# Patient Record
Sex: MALE | Race: WHITE | Employment: OTHER | URBAN - METROPOLITAN AREA
[De-identification: names, ages, dates, MRNs, and addresses within clinical notes are randomized per-mention and may not be internally consistent; named-entity substitution may affect disease eponyms.]

---

## 2017-01-09 ENCOUNTER — CLINICAL SUPPORT (OUTPATIENT)
Dept: CARDIOLOGY CLINIC | Age: 77
End: 2017-01-09

## 2017-01-09 DIAGNOSIS — I35.1 AORTIC VALVE INSUFFICIENCY, UNSPECIFIED ETIOLOGY: ICD-10-CM

## 2017-01-09 DIAGNOSIS — I25.10 CORONARY ARTERY DISEASE INVOLVING NATIVE CORONARY ARTERY OF NATIVE HEART WITHOUT ANGINA PECTORIS: Primary | ICD-10-CM

## 2017-01-11 ENCOUNTER — TELEPHONE (OUTPATIENT)
Dept: CARDIOLOGY CLINIC | Age: 77
End: 2017-01-11

## 2017-02-09 RX ORDER — CARVEDILOL 12.5 MG/1
TABLET ORAL
Qty: 180 TAB | Refills: 0 | Status: SHIPPED | OUTPATIENT
Start: 2017-02-09 | End: 2017-04-28 | Stop reason: SDUPTHER

## 2017-04-29 RX ORDER — CARVEDILOL 12.5 MG/1
TABLET ORAL
Qty: 180 TAB | Refills: 0 | Status: SHIPPED | OUTPATIENT
Start: 2017-04-29 | End: 2017-06-22 | Stop reason: SDUPTHER

## 2017-06-22 ENCOUNTER — OFFICE VISIT (OUTPATIENT)
Dept: CARDIOLOGY CLINIC | Age: 77
End: 2017-06-22

## 2017-06-22 VITALS
HEART RATE: 66 BPM | OXYGEN SATURATION: 97 % | BODY MASS INDEX: 32.02 KG/M2 | HEIGHT: 65 IN | SYSTOLIC BLOOD PRESSURE: 156 MMHG | DIASTOLIC BLOOD PRESSURE: 84 MMHG | WEIGHT: 192.2 LBS | RESPIRATION RATE: 18 BRPM

## 2017-06-22 DIAGNOSIS — E78.5 DYSLIPIDEMIA: ICD-10-CM

## 2017-06-22 DIAGNOSIS — G47.33 OSA ON CPAP: ICD-10-CM

## 2017-06-22 DIAGNOSIS — Z99.89 OSA ON CPAP: ICD-10-CM

## 2017-06-22 DIAGNOSIS — I10 ESSENTIAL HYPERTENSION WITH GOAL BLOOD PRESSURE LESS THAN 130/80: ICD-10-CM

## 2017-06-22 DIAGNOSIS — I25.10 CORONARY ARTERY DISEASE INVOLVING NATIVE CORONARY ARTERY OF NATIVE HEART WITHOUT ANGINA PECTORIS: Primary | ICD-10-CM

## 2017-06-22 RX ORDER — CARVEDILOL 12.5 MG/1
TABLET ORAL
Qty: 180 TAB | Refills: 3 | Status: SHIPPED | OUTPATIENT
Start: 2017-06-22 | End: 2018-09-08 | Stop reason: SDUPTHER

## 2017-06-22 RX ORDER — COLCHICINE 0.6 MG/1
0.6 TABLET ORAL AS NEEDED
COMMUNITY

## 2017-06-22 RX ORDER — GLIPIZIDE 2.5 MG/1
5 TABLET, EXTENDED RELEASE ORAL 2 TIMES DAILY WITH MEALS
COMMUNITY

## 2017-06-22 NOTE — MR AVS SNAPSHOT
Visit Information Date & Time Provider Department Dept. Phone Encounter #  
 6/22/2017 10:40 AM Artemio Brewster MD [facility] Jacey Orozco [phone] [id number] Your Appointments 12/21/2017 10:20 AM  
ESTABLISHED PATIENT with Artemio Brewster MD  
[facility] ([facility]) Appt Note: 6 MO FUP  
 [id number] [address] [facility]  
[address] [place] [address] [facility] [address] Upcoming Health Maintenance Date Due DTaP/Tdap/Td series (1 - Tdap) 4/3/1962 ZOSTER VACCINE AGE 60> 4/3/2001 GLAUCOMA SCREENING Q2Y 4/3/2006 Pneumococcal 65+ Low/Medium Risk (1 of 2 - PCV13) 4/3/2006 MEDICARE YEARLY EXAM 4/3/2006 INFLUENZA AGE 9 TO ADULT 8/1/2017 Allergies as of 6/22/2017  Review Complete On: 6/22/2017 By: Connor Mcgowan LPN Severity Noted Reaction Type Reactions Sulfa (Sulfonamide Antibiotics)  11/18/2015    Unknown (comments) Current Immunizations  Never Reviewed No immunizations on file. Not reviewed this visit Vitals BP Pulse Resp Height(growth percentile) Weight(growth percentile) SpO2  
 156/84 (BP 1 Location: Right arm, BP Patient Position: Sitting) 66 18 5' 5\" (1.651 m) 192 lb 3.2 oz (87.2 kg) 97% BMI Smoking Status 31.98 kg/m2 Former Smoker BMI and BSA Data Body Mass Index Body Surface Area 31.98 kg/m 2 2 m 2 Preferred Pharmacy Pharmacy Name Phone [facility] [address] [phone] Your Updated Medication List  
  
   
This list is accurate as of: 6/22/17 10:54 AM.  Always use your most recent med list.  
  
  
  
  
 aspirin 81 mg chewable tablet Take 1 Tab by mouth daily. carvedilol 12.5 mg tablet Commonly known as:  COREG  
TAKE 1 TABLET BY MOUTH TWICE DAILY WITH MEALS  
  
 colchicine 0.6 mg tablet Take 0.6 mg by mouth as needed. cpap machine kit  
by Does Not Apply route. fluticasone 50 mcg/actuation nasal spray Commonly known as:  Elma Fray 2 Sprays by Both Nostrils route as needed for Rhinitis. glipiZIDE SR 2.5 mg CR tablet Commonly known as:  GLUCOTROL XL Take  by mouth daily. LIPITOR 10 mg tablet Generic drug:  atorvastatin Take  by mouth daily. TYLENOL PM PO Take  by mouth as needed. Patient Instructions See Dr. Cristina Barajas in 6 months. Introducing [facility] & HEALTH SERVICES! Toribio Nyhan introduces [employer] patient portal. Now you can access parts of your medical record, email your doctor's office, and request medication refills online. 1. In your internet browser, go to [email or web address] 2. Click on the First Time User? Click Here link in the Sign In box. You will see the New Member Sign Up page. 3. Enter your [employer] Access Code exactly as it appears below. You will not need to use this code after youve completed the sign-up process. If you do not sign up before the expiration date, you must request a new code. · [employer] Access Code: [email or web address] Expires: 9/20/2017 10:50 AM 
 
4. Enter the last four digits of your Social Security Number (xxxx) and Date of Birth (mm/dd/yyyy) as indicated and click Submit. You will be taken to the next sign-up page. 5. Create a [employer] ID. This will be your [employer] login ID and cannot be changed, so think of one that is secure and easy to remember. 6. Create a [employer] password. You can change your password at any time. 7. Enter your Password Reset Question and Answer. This can be used at a later time if you forget your password. 8. Enter your e-mail address. You will receive e-mail notification when new information is available in [address] 9. Click Sign Up. You can now view and download portions of your medical record. 10. Click the Download Summary menu link to download a portable copy of your medical information. If you have questions, please visit the Frequently Asked Questions section of the [employer] website. Remember, [employer] is NOT to be used for urgent needs. For medical emergencies, dial 911. Now available from your iPhone and Android! Please provide this summary of care documentation to your next provider. Your primary care clinician is listed as Marbin Earl. If you have any questions after today's visit, please call [phone].

## 2017-06-22 NOTE — PROGRESS NOTES
Office Follow-up    NAME: Ana Figueroa   :  1940  MRM:  [id number]    Date:  2017            Assessment:     Problem List  Date Reviewed: 2017          Codes Class Noted    CAD (coronary artery disease) ICD-10-CM: I25.10  ICD-9-CM: 414.00  2015        Chest pain ICD-10-CM: R07.9  ICD-9-CM: 786.50  2015        SOB (shortness of breath) ICD-10-CM: R06.02  ICD-9-CM: 786.05  2015                 Plan:     1. CAD/ S/p LAD TRELL x2. POBA LCX: Now he is off DAPT. Continue ASA and Coreg. Continue Statins. 2. HTN: Blood pressure in office today is high but he conveys that he goes to cardiac rehab on a weekly basis and his blood pressure over at cardiac rehab is normal.  At this time we will continue to watch blood pressure. Today's elevation is likely white coat hypertension. Continue Coreg. 3. Hyperlipidemia: NMR lipids from 16 were all within normal range. Continue statins. 4. ELLYN: Now formally diagnosed and uses CPAP. 5. GERD: PPI as needed. 6. Murmur: Recent echo shows AV valve sclerosis. 7. FU with me in 6 months. Subjective:     Ana Figueroa, a 69y.o. year-old who presents for follow up. He is doing well without any significant symptoms. He is now off Brilinta. He exercise on a regular basis and goes to cardiac rehab at [facility] twice per week.   Exam:     Physical Exam:  Visit Vitals    /84 (BP 1 Location: Right arm, BP Patient Position: Sitting)    Pulse 66    Resp 18    Ht 5' 5\" (1.651 m)    Wt 192 lb 3.2 oz (87.2 kg)    SpO2 97%    BMI 31.98 kg/m2     General appearance - alert, well appearing, and in no distress  Mental status - affect appropriate to mood  Eyes - sclera anicteric, moist mucous membranes  Neck - supple, no significant adenopathy  Chest - clear to auscultation, no wheezes, rales or rhonchi  Heart - normal rate, regular rhythm, normal S1, S2, ESM 3/6 aortic region, rubs, clicks or gallops  Extremities - peripheral pulses normal, no pedal edema  Skin - normal coloration  no rashes    Medications:     Current Outpatient Prescriptions   Medication Sig    colchicine 0.6 mg tablet Take 0.6 mg by mouth as needed.  glipiZIDE SR (GLUCOTROL XL) 2.5 mg CR tablet Take  by mouth daily.  carvedilol (COREG) 12.5 mg tablet TAKE 1 TABLET BY MOUTH TWICE DAILY WITH MEALS    cpap machine kit by Does Not Apply route.  ACETAMINOPHEN/DIPHENHYDRAMINE (TYLENOL PM PO) Take  by mouth as needed.  aspirin 81 mg chewable tablet Take 1 Tab by mouth daily.  atorvastatin (LIPITOR) 10 mg tablet Take  by mouth daily.  fluticasone (FLONASE) 50 mcg/actuation nasal spray 2 Sprays by Both Nostrils route as needed for Rhinitis. No current facility-administered medications for this visit. Diagnostic Data Review:     EKG: normal sinus rhythm, normal ST segment, normal axis, normal intervals. 1/9/17: ECHO- LVEF 55%, mild hypokinesis of the basal-mid inferolateral wall(s), G1DD; Mild MR; AV leaflets exhibited sclerosis, mod AR; mild TR; mild DC    12/28/16: LEXISCAN- Normal Study LVEF 87%, no ischemic ST changes  CMRI 12/2/16 12/9/2015: CATH  --- S/p TRELL ( 2.25 x 23, 2.25 x 12 )-> mLAD, PTCA mLCX W/ residual 80-90% but improved flow in distal vess  11/18/15: STRESS NUC- EF 55%, inferolateral mod partially reversible defect w/ mid-mod carole-infarct ischemia   El  11/18/15: CARDIAC MRI- 1. Normal left ventricular cavity size with preserved left ventricular   systolic function. Moderate hypokinesis of the base to mid and distal   lateral wall. Thinning of the basal lateral wall. 3-D LVEF 62%. 2. Normal right ventricular size and systolic function. RVEF 55%. 3. Mild 1+ aortic regurgitation. 4. 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 There is only a thin   rim of viable myocardium subtending this infarct.   The entire anterior wall, anteroseptal wall, anteroapical wall, apex, inferior wall, inferoseptal wall   and inferolateral wall does not demonstrate any infarct and are completely   viable. Based on the viability study the LCx territory does not demonstrate   any significant viability and has a large infarct and will not recover upon   revascularization. The LAD territory and RCA territories demonstrate   significant viability and does not demonstrate any infarct. 5. Normal pleura and pericardium. There is no significant effusions. 6. Mildly dilated ascending aorta measuring 40 x 30 mm. 7. Possibly enlarged thyroid with possible thyroid nodule. Lab Review:     Lab Results   Component Value Date/Time    Cholesterol, Total 146 01/06/2016 07:49 AM     Lab Results   Component Value Date/Time    Creatinine 0.94 12/10/2015 03:00 AM     Lab Results   Component Value Date/Time    BUN 11 12/10/2015 03:00 AM     Lab Results   Component Value Date/Time    Potassium 4.5 12/10/2015 03:00 AM     No results found for: HBA1C, HGBE8, FBH1LFRE, XGB3RXNZ  Lab Results   Component Value Date/Time    HGB 14.6 11/24/2015 07:45 AM     Lab Results   Component Value Date/Time    PLATELET 411 22/23/2566 07:45 AM     No results for input(s): CPK, CKMB, TROIQ in the last 72 hours. No lab exists for component: CKQMB, CPKMB         ___________________________________________________    Hermelinda Courts.  Rashard Root MD, [facility] [place]

## 2017-12-21 ENCOUNTER — OFFICE VISIT (OUTPATIENT)
Dept: CARDIOLOGY CLINIC | Age: 77
End: 2017-12-21

## 2017-12-21 VITALS
HEART RATE: 65 BPM | WEIGHT: 197.8 LBS | DIASTOLIC BLOOD PRESSURE: 80 MMHG | OXYGEN SATURATION: 97 % | RESPIRATION RATE: 16 BRPM | HEIGHT: 65 IN | SYSTOLIC BLOOD PRESSURE: 148 MMHG | BODY MASS INDEX: 32.96 KG/M2

## 2017-12-21 DIAGNOSIS — I25.10 CORONARY ARTERY DISEASE INVOLVING NATIVE CORONARY ARTERY OF NATIVE HEART WITHOUT ANGINA PECTORIS: Primary | ICD-10-CM

## 2017-12-21 DIAGNOSIS — I10 ESSENTIAL HYPERTENSION: ICD-10-CM

## 2017-12-21 DIAGNOSIS — I35.0 NONRHEUMATIC AORTIC VALVE STENOSIS: ICD-10-CM

## 2017-12-21 NOTE — PROGRESS NOTES
Office Follow-up    NAME: Jesica Mendes   :  1940  MRM:  [id number]    Date:  2017            Assessment:     Problem List  Date Reviewed: 2017          Codes Class Noted    CAD (coronary artery disease) ICD-10-CM: I25.10  ICD-9-CM: 414.00  2015        Chest pain ICD-10-CM: R07.9  ICD-9-CM: 786.50  2015        SOB (shortness of breath) ICD-10-CM: R06.02  ICD-9-CM: 786.05  2015                 Plan:     1. CAD/ S/p LAD TRELL x2. POBA LCX: Now he is off DAPT. Continue ASA and Coreg. Continue Statins. 2. HTN: Blood pressure in office today is high. At this time we will continue to watch blood pressure. Today's elevation is likely white coat hypertension. Continue Coreg. 3. Hyperlipidemia: NMR lipids from 16 were all within normal range. Continue statins. 4. ELLYN: Now formally diagnosed and uses CPAP. 5. GERD: PPI as needed. 6. Murmur: Recent echo shows AV valve sclerosis. 7. FU with me in 6 months. Subjective:     Jesica Mendes, a 69y.o. year-old who presents for follow up. He is doing well without any significant symptoms. He is now off Brilinta. He has been hunting recently and has experienced occasional very mild upper anterior chest wall pain on exertion. This is not always present. He is otherwise doing well.       Exam:     Physical Exam:  Visit Vitals    /80 (BP 1 Location: Left arm, BP Patient Position: Sitting)    Pulse 65    Resp 16    Ht 5' 5\" (1.651 m)    Wt 197 lb 12.8 oz (89.7 kg)    SpO2 97%    BMI 32.92 kg/m2     General appearance - alert, well appearing, and in no distress  Mental status - affect appropriate to mood  Eyes - sclera anicteric, moist mucous membranes  Neck - supple, no significant adenopathy  Chest - clear to auscultation, no wheezes, rales or rhonchi  Heart - normal rate, regular rhythm, normal S1, S2, ESM 3/6 aortic region, rubs, clicks or gallops  Extremities - peripheral pulses normal, no pedal edema  Skin - normal coloration  no rashes    Medications:     Current Outpatient Prescriptions   Medication Sig    colchicine 0.6 mg tablet Take 0.6 mg by mouth as needed.  glipiZIDE SR (GLUCOTROL XL) 2.5 mg CR tablet Take  by mouth daily.  carvedilol (COREG) 12.5 mg tablet TAKE 1 TABLET BY MOUTH TWICE DAILY WITH MEALS    cpap machine kit by Does Not Apply route.  fluticasone (FLONASE) 50 mcg/actuation nasal spray 2 Sprays by Both Nostrils route as needed for Rhinitis.  aspirin 81 mg chewable tablet Take 1 Tab by mouth daily.  atorvastatin (LIPITOR) 10 mg tablet Take  by mouth daily.  ACETAMINOPHEN/DIPHENHYDRAMINE (TYLENOL PM PO) Take  by mouth as needed. No current facility-administered medications for this visit. Diagnostic Data Review:     EKG: normal sinus rhythm, normal ST segment, normal axis, normal intervals. 1/9/17: ECHO- LVEF 55%, mild hypokinesis of the basal-mid inferolateral wall(s), G1DD; Mild MR; AV leaflets exhibited sclerosis, mod AR; mild TR; mild WA    12/28/16: LEXISCAN- Normal Study LVEF 87%, no ischemic ST changes  CMRI 12/2/15    12/9/2015: CATH  --- S/p TRELL ( 2.25 x 23, 2.25 x 12 )-> mLAD, PTCA mLCX W/ residual 80-90% but improved flow in distal vess  11/18/15: STRESS NUC- EF 55%, inferolateral mod partially reversible defect w/ mid-mod carole-infarct ischemia   El  12/2/15: CARDIAC MRI- 1. Normal left ventricular cavity size with preserved left ventricular   systolic function. Moderate hypokinesis of the base to mid and distal   lateral wall. Thinning of the basal lateral wall. 3-D LVEF 62%. 2. Normal right ventricular size and systolic function. RVEF 55%. 3. Mild 1+ aortic regurgitation. 4. 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 There is only a thin   rim of viable myocardium subtending this infarct.  The entire anterior wall,   anteroseptal wall, anteroapical wall, apex, inferior wall, inferoseptal wall   and inferolateral wall does not demonstrate any infarct and are completely   viable. Based on the viability study the LCx territory does not demonstrate   any significant viability and has a large infarct and will not recover upon   revascularization. The LAD territory and RCA territories demonstrate   significant viability and does not demonstrate any infarct. 5. Normal pleura and pericardium. There is no significant effusions. 6. Mildly dilated ascending aorta measuring 40 x 30 mm. 7. Possibly enlarged thyroid with possible thyroid nodule. Lab Review:     Lab Results   Component Value Date/Time    Cholesterol, Total 146 01/06/2016 07:49 AM     Lab Results   Component Value Date/Time    Creatinine 0.94 12/10/2015 03:00 AM     Lab Results   Component Value Date/Time    BUN 11 12/10/2015 03:00 AM     Lab Results   Component Value Date/Time    Potassium 4.5 12/10/2015 03:00 AM     No results found for: HBA1C, HGBE8, NSZ9DNFV, OMV0JLYU  Lab Results   Component Value Date/Time    HGB 14.6 11/24/2015 07:45 AM     Lab Results   Component Value Date/Time    PLATELET 996 06/00/1674 07:45 AM     No results for input(s): CPK, CKMB, TROIQ in the last 72 hours. No lab exists for component: CKQMB, CPKMB         ___________________________________________________    Chay Hogan.  Farzana Pink MD, [facility] [place]

## 2017-12-21 NOTE — PROGRESS NOTES
Elan Syed is a 69 y.o. male  Chief Complaint   Patient presents with    Coronary Artery Disease     6 mo f/u     1. Have you been to an emergency room, urgent clinic, or hospitalized since your last visit? NO  If yes, where when, and reason for visit? 2. Have seen or consulted any other health care provider since your last visit? NO  Please include any pap smears or colon screening in this section  If yes, where when, and reason for visit?

## 2017-12-21 NOTE — MR AVS SNAPSHOT
Visit Information Date & Time Provider Department Dept. Phone Encounter #  
 12/21/2017 10:20 AM Kyara Weber MD [facility] Teddy Johansen [phone] [id number] Upcoming Health Maintenance Date Due DTaP/Tdap/Td series (1 - Tdap) 4/3/1962 ZOSTER VACCINE AGE 60> 2/3/2001 GLAUCOMA SCREENING Q2Y 4/3/2006 Pneumococcal 65+ Low/Medium Risk (1 of 2 - PCV13) 4/3/2006 MEDICARE YEARLY EXAM 4/3/2006 Influenza Age 5 to Adult 8/1/2017 Allergies as of 12/21/2017  Review Complete On: 12/21/2017 By: Kyara Weber MD  
  
 Severity Noted Reaction Type Reactions Sulfa (Sulfonamide Antibiotics)  11/18/2015    Unknown (comments) Current Immunizations  Never Reviewed No immunizations on file. Not reviewed this visit You Were Diagnosed With   
  
 Codes Comments Coronary artery disease involving native coronary artery of native heart without angina pectoris    -  Primary ICD-10-CM: I25.10 ICD-9-CM: 414.01 Vitals BP Pulse Resp Height(growth percentile) Weight(growth percentile) SpO2  
 148/80 (BP 1 Location: Left arm, BP Patient Position: Sitting) 65 16 5' 5\" (1.651 m) 197 lb 12.8 oz (89.7 kg) 97% BMI Smoking Status 32.92 kg/m2 Former Smoker Vitals History BMI and BSA Data Body Mass Index Body Surface Area  
 32.92 kg/m 2 2.03 m 2 Preferred Pharmacy Pharmacy Name Phone [facility] [address] AT [address]  [phone] Your Updated Medication List  
  
   
This list is accurate as of: 12/21/17 10:46 AM.  Always use your most recent med list.  
  
  
  
  
 aspirin 81 mg chewable tablet Take 1 Tab by mouth daily. carvedilol 12.5 mg tablet Commonly known as:  COREG  
TAKE 1 TABLET BY MOUTH TWICE DAILY WITH MEALS  
  
 colchicine 0.6 mg tablet Take 0.6 mg by mouth as needed. cpap machine kit  
by Does Not Apply route. fluticasone 50 mcg/actuation nasal spray Commonly known as:  Kelsey Gut 2 Sprays by Both Nostrils route as needed for Rhinitis. glipiZIDE SR 2.5 mg CR tablet Commonly known as:  GLUCOTROL XL Take  by mouth daily. LIPITOR 10 mg tablet Generic drug:  atorvastatin Take  by mouth daily. TYLENOL PM PO Take  by mouth as needed. We Performed the Following AMB POC EKG ROUTINE W/ 12 LEADS, INTER & REP [59913 CPT(R)] Patient Instructions See Dr. Jameel Oviedo in 6 months. Introducing [facility]! [employer] introduces [employer] patient portal. Now you can access parts of your medical record, email your doctor's office, and request medication refills online. 1. In your internet browser, go to [email or web address] 2. Click on the First Time User? Click Here link in the Sign In box. You will see the New Member Sign Up page. 3. Enter your [employer] Access Code exactly as it appears below. You will not need to use this code after youve completed the sign-up process. If you do not sign up before the expiration date, you must request a new code. · [employer] Access Code: [email or web address] Expires: 3/21/2018 10:46 AM 
 
4. Enter the last four digits of your Social Security Number (xxxx) and Date of Birth (mm/dd/yyyy) as indicated and click Submit. You will be taken to the next sign-up page. 5. Create a [employer] ID. This will be your [employer] login ID and cannot be changed, so think of one that is secure and easy to remember. 6. Create a [employer] password. You can change your password at any time. 7. Enter your Password Reset Question and Answer. This can be used at a later time if you forget your password. 8. Enter your e-mail address. You will receive e-mail notification when new information is available in [address] 9. Click Sign Up. You can now view and download portions of your medical record. 10. Click the Download Summary menu link to download a portable copy of your medical information. If you have questions, please visit the Frequently Asked Questions section of the [employer] website. Remember, [employer] is NOT to be used for urgent needs. For medical emergencies, dial 911. Now available from your iPhone and Android! Please provide this summary of care documentation to your next provider. Your primary care clinician is listed as Shilpa De La Rosa. If you have any questions after today's visit, please call [phone].

## 2018-06-08 ENCOUNTER — OFFICE VISIT (OUTPATIENT)
Dept: CARDIOLOGY CLINIC | Age: 78
End: 2018-06-08

## 2018-06-08 VITALS
DIASTOLIC BLOOD PRESSURE: 82 MMHG | HEIGHT: 65 IN | HEART RATE: 64 BPM | WEIGHT: 200 LBS | OXYGEN SATURATION: 97 % | BODY MASS INDEX: 33.32 KG/M2 | SYSTOLIC BLOOD PRESSURE: 140 MMHG

## 2018-06-08 DIAGNOSIS — G47.33 OSA ON CPAP: ICD-10-CM

## 2018-06-08 DIAGNOSIS — Z99.89 OSA ON CPAP: ICD-10-CM

## 2018-06-08 DIAGNOSIS — I10 ESSENTIAL HYPERTENSION WITH GOAL BLOOD PRESSURE LESS THAN 130/80: ICD-10-CM

## 2018-06-08 DIAGNOSIS — I10 ESSENTIAL HYPERTENSION: ICD-10-CM

## 2018-06-08 DIAGNOSIS — I25.10 CORONARY ARTERY DISEASE INVOLVING NATIVE CORONARY ARTERY OF NATIVE HEART WITHOUT ANGINA PECTORIS: Primary | ICD-10-CM

## 2018-06-08 DIAGNOSIS — I71.21 ASCENDING AORTIC ANEURYSM: ICD-10-CM

## 2018-06-08 DIAGNOSIS — I35.0 NONRHEUMATIC AORTIC VALVE STENOSIS: ICD-10-CM

## 2018-06-08 DIAGNOSIS — E78.5 DYSLIPIDEMIA: ICD-10-CM

## 2018-06-08 NOTE — PROGRESS NOTES
Visit Vitals    /82 (BP 1 Location: Left arm, BP Patient Position: Sitting)    Pulse 64    Ht 5' 5\" (1.651 m)    Wt 200 lb (90.7 kg)    SpO2 97%    BMI 33.28 kg/m2

## 2018-06-08 NOTE — MR AVS SNAPSHOT
[address] [facility] 600 70 [facility] 
[phone] Patient: Stacey Nguyen MRN: [id number] Visit Information Date & Time Provider Department Dept. Phone Encounter #  
 2018  3:20 PM Nelson Guy MD [facility] Erwin Gallegos [phone] [id number] Follow-up Instructions Follow-up and Disposition History Your Appointments 2019  1:00 PM  
ECHO CARDIOGRAMS 2D with ECHOGEREMIAS  
[facility] (FANNIE SCHEDULING) Appt Note: See Dr. Dinesh Mcnally in 1 year with same day Echo for aortic regurgitation. 320 [facility] 600 [place] [address]  
[phone]  
  
   
 [address] [address]  
  
    
 2019  1:40 PM  
ESTABLISHED PATIENT with Nelson Guy MD  
[address] ([address]) Appt Note: See Dr. Dinesh Mcnally in 1 year with same day Echo for aortic regurgitation. 320 [facility] [address] [place] [address]  
54 e [facility] [address] [address] Upcoming Health Maintenance Date Due DTaP/Tdap/Td series (1 - Tdap) 4/3/1962 ZOSTER VACCINE AGE 60> 2/3/2001 GLAUCOMA SCREENING Q2Y 4/3/2006 Pneumococcal 65+ Low/Medium Risk (1 of 2 - PCV13) 4/3/2006 MEDICARE YEARLY EXAM 3/14/2018 Influenza Age 5 to Adult 2018 Allergies as of 2018  Review Complete On: 2018 By: Nelson Guy MD  
  
 Severity Noted Reaction Type Reactions Sulfa (Sulfonamide Antibiotics)  2015    Unknown (comments) Current Immunizations  Never Reviewed No immunizations on file. Not reviewed this visit You Were Diagnosed With   
  
 Codes Comments Coronary artery disease involving native coronary artery of native heart without angina pectoris    -  Primary ICD-10-CM: I25.10 ICD-9-CM: 414.01 Essential hypertension     ICD-10-CM: I10 
ICD-9-CM: 401.9 Nonrheumatic aortic valve stenosis     ICD-10-CM: I35.0 ICD-9-CM: 424.1 Essential hypertension with goal blood pressure less than 130/80     ICD-10-CM: I10 
ICD-9-CM: 401.9 ELLYN on CPAP     ICD-10-CM: G47.33, Z99.89 ICD-9-CM: 327.23, V46.8 Dyslipidemia     ICD-10-CM: E78.5 ICD-9-CM: 272.4 Ascending aortic aneurysm (HCC)     ICD-10-CM: I71.2 ICD-9-CM: 347. 2 Vitals BP Pulse Height(growth percentile) Weight(growth percentile) SpO2 BMI  
 140/82 (BP 1 Location: Left arm, BP Patient Position: Sitting) 64 5' 5\" (1.651 m) 200 lb (90.7 kg) 97% 33.28 kg/m2 Smoking Status Former Smoker Vitals History BMI and BSA Data Body Mass Index Body Surface Area  
 33.28 kg/m 2 2.04 m 2 Preferred Pharmacy Pharmacy Name Phone [id number] [address] [place] [address]) [phone] Your Updated Medication List  
  
   
This list is accurate as of 6/8/18  3:44 PM.  Always use your most recent med list.  
  
  
  
  
 aspirin 81 mg chewable tablet Take 1 Tab by mouth daily. carvedilol 12.5 mg tablet Commonly known as:  COREG  
TAKE 1 TABLET BY MOUTH TWICE DAILY WITH MEALS  
  
 colchicine 0.6 mg tablet Take 0.6 mg by mouth as needed. cpap machine kit  
by Does Not Apply route. fluticasone 50 mcg/actuation nasal spray Commonly known as:  Cyndra Puna 2 Sprays by Both Nostrils route as needed for Rhinitis. glipiZIDE SR 2.5 mg CR tablet Commonly known as:  GLUCOTROL XL Take  by mouth daily. LIPITOR 10 mg tablet Generic drug:  atorvastatin Take  by mouth daily. TYLENOL PM PO Take  by mouth as needed. Patient Instructions See Dr. Adonay Kwan in 1 year with same day Echo for aortic regurgitation. Introducing [facility]! Gerald Garcia introduces [employer] patient portal. Now you can access parts of your medical record, email your doctor's office, and request medication refills online. 1. In your internet browser, go to [email or web address] 2. Click on the First Time User? Click Here link in the Sign In box. You will see the New Member Sign Up page. 3. Enter your [employer] Access Code exactly as it appears below. You will not need to use this code after youve completed the sign-up process. If you do not sign up before the expiration date, you must request a new code. · [employer] Access Code: [email or web address] Expires: 9/6/2018  3:44 PM 
 
4. Enter the last four digits of your Social Security Number (xxxx) and Date of Birth (mm/dd/yyyy) as indicated and click Submit. You will be taken to the next sign-up page. 5. Create a [employer] ID. This will be your [employer] login ID and cannot be changed, so think of one that is secure and easy to remember. 6. Create a [employer] password. You can change your password at any time. 7. Enter your Password Reset Question and Answer. This can be used at a later time if you forget your password. 8. Enter your e-mail address. You will receive e-mail notification when new information is available in [address] 9. Click Sign Up. You can now view and download portions of your medical record. 10. Click the Download Summary menu link to download a portable copy of your medical information. If you have questions, please visit the Frequently Asked Questions section of the [employer] website. Remember, [employer] is NOT to be used for urgent needs. For medical emergencies, dial 911. Now available from your iPhone and Android! Please provide this summary of care documentation to your next provider. Your primary care clinician is listed as Last Britton. If you have any questions after today's visit, please call [phone].

## 2018-06-08 NOTE — PROGRESS NOTES
Office Follow-up    NAME: Lizz Watson   :  1940  MRM:  [id number]    Date:  2018            Assessment:     Problem List  Date Reviewed: 2018          Codes Class Noted    CAD (coronary artery disease) ICD-10-CM: I25.10  ICD-9-CM: 414.00  2015        Chest pain ICD-10-CM: R07.9  ICD-9-CM: 786.50  2015        SOB (shortness of breath) ICD-10-CM: R06.02  ICD-9-CM: 786.05  2015                 Plan:     1. CAD/status post LAD TRELL stent ×2, probable LCx: Continue ASA and statins. 2. Moderate aortic regurgitation: Last echocardiogram back in 2017. Surveillance echocardiogram again in . 3. Hypertension: Blood pressure is controlled. Continue current medications. 4. Mild aortic aneurysm: Last MRI showed ascending aorta at 41 mm. Will do a surveillance MRI in  or .  5. Sleep apnea: Continue CPAP  6. Hyperlipidemia: Continue statins. 7. See Dr. Romeo Garcia in 1 year. Subjective:     Lizz Watson, a 69y.o. year-old who presents for followup. He has known history of CAD status post LAD TRELL stent ×2 and lipoma of the left circumflex artery. He is off of Plavix now. He is doing well. His blood pressure is controlled. He has no symptoms of chest pain, shortness of breath, lightheadedness, dizziness presyncope or syncope. Able to take his current medications. He has easy bruisability of the skin despite being only on aspirin 81 mg p.o. daily. Is also known to have moderate aortic regurgitation and mild aortic aneurysm.     Exam:     Physical Exam:  Visit Vitals    /82 (BP 1 Location: Left arm, BP Patient Position: Sitting)    Pulse 64    Ht 5' 5\" (1.651 m)    Wt 200 lb (90.7 kg)    SpO2 97%    BMI 33.28 kg/m2     General appearance - alert, well appearing, and in no distress  Mental status - affect appropriate to mood  Eyes - sclera anicteric, moist mucous membranes  Neck - supple, no significant adenopathy  Chest - clear to auscultation, no wheezes, rales or rhonchi  Heart - normal rate, regular rhythm, normal S1, S2, ESM grade 2/6 aortic region. No rubs, clicks or gallops  Abdomen - soft, nontender, nondistended, no masses or organomegaly  Extremities - peripheral pulses normal, no pedal edema  Skin - normal coloration  no rashes    Medications:     Current Outpatient Prescriptions   Medication Sig    colchicine 0.6 mg tablet Take 0.6 mg by mouth as needed.  glipiZIDE SR (GLUCOTROL XL) 2.5 mg CR tablet Take  by mouth daily.  carvedilol (COREG) 12.5 mg tablet TAKE 1 TABLET BY MOUTH TWICE DAILY WITH MEALS    cpap machine kit by Does Not Apply route.  fluticasone (FLONASE) 50 mcg/actuation nasal spray 2 Sprays by Both Nostrils route as needed for Rhinitis.  ACETAMINOPHEN/DIPHENHYDRAMINE (TYLENOL PM PO) Take  by mouth as needed.  aspirin 81 mg chewable tablet Take 1 Tab by mouth daily.  atorvastatin (LIPITOR) 10 mg tablet Take  by mouth daily. No current facility-administered medications for this visit. Diagnostic Data Review:       1/9/17: ECHO- LVEF 55%, mild hypokinesis of the basal-mid inferolateral wall(s), G1DD; Mild MR; AV leaflets exhibited sclerosis, mod AR; mild TR; mild VA    12/28/16: LEXISCAN- Normal Study LVEF 87%, no ischemic ST changes  CMRI 12/2/15    12/9/2015: CATH  --- S/p TRELL ( 2.25 x 23, 2.25 x 12 )-> mLAD, PTCA mLCX W/ residual 80-90% but improved flow in distal vess  11/18/15: STRESS NUC- EF 55%, inferolateral mod partially reversible defect w/ mid-mod carole-infarct ischemia   El  12/2/15: CARDIAC MRI- 1. Normal left ventricular cavity size with preserved left ventricular   systolic function. Moderate hypokinesis of the base to mid and distal   lateral wall. Thinning of the basal lateral wall. 3-D LVEF 62%. 2. Normal right ventricular size and systolic function. RVEF 55%. 3. Mild 1+ aortic regurgitation. 4.  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 There is only a thin   rim of viable myocardium subtending this infarct. The entire anterior wall,   anteroseptal wall, anteroapical wall, apex, inferior wall, inferoseptal wall   and inferolateral wall does not demonstrate any infarct and are completely   viable. Based on the viability study the LCx territory does not demonstrate   any significant viability and has a large infarct and will not recover upon   revascularization. The LAD territory and RCA territories demonstrate   significant viability and does not demonstrate any infarct. 5. Normal pleura and pericardium. There is no significant effusions. 6. Mildly dilated ascending aorta measuring 40 x 30 mm. 7. Possibly enlarged thyroid with possible thyroid nodule. Lab Review:     Lab Results   Component Value Date/Time    Cholesterol, Total 146 01/06/2016 07:49 AM     Lab Results   Component Value Date/Time    Creatinine 0.94 12/10/2015 03:00 AM     Lab Results   Component Value Date/Time    BUN 11 12/10/2015 03:00 AM     Lab Results   Component Value Date/Time    Potassium 4.5 12/10/2015 03:00 AM     No results found for: HBA1C, HGBE8, KRG4INLF  Lab Results   Component Value Date/Time    HGB 14.6 11/24/2015 07:45 AM     Lab Results   Component Value Date/Time    PLATELET 762 92/21/4945 07:45 AM     No results for input(s): CPK, CKMB, TROIQ in the last 72 hours. No lab exists for component: CKQMB, CPKMB             ___________________________________________________    Aviva Hinds.  Dinesh Mcnally MD, [facility]

## 2018-09-08 RX ORDER — CARVEDILOL 12.5 MG/1
TABLET ORAL
Qty: 180 TAB | Refills: 1 | Status: SHIPPED | OUTPATIENT
Start: 2018-09-08 | End: 2019-05-28 | Stop reason: SDUPTHER

## 2019-05-28 RX ORDER — CARVEDILOL 12.5 MG/1
12.5 TABLET ORAL 2 TIMES DAILY
Qty: 180 TAB | Refills: 3 | Status: SHIPPED | OUTPATIENT
Start: 2019-05-28 | End: 2020-06-01

## 2019-05-28 NOTE — TELEPHONE ENCOUNTER
Refill of carvedilol 12.5 mg BID completed per VO of Dr. Federico Schwarzt.     Last OV: 6/2018  Next OV: 6/2019

## 2019-06-24 ENCOUNTER — OFFICE VISIT (OUTPATIENT)
Dept: CARDIOLOGY CLINIC | Age: 79
End: 2019-06-24

## 2019-06-24 VITALS
HEIGHT: 65 IN | BODY MASS INDEX: 32.49 KG/M2 | WEIGHT: 195 LBS | RESPIRATION RATE: 16 BRPM | HEART RATE: 64 BPM | DIASTOLIC BLOOD PRESSURE: 94 MMHG | OXYGEN SATURATION: 98 % | SYSTOLIC BLOOD PRESSURE: 172 MMHG

## 2019-06-24 DIAGNOSIS — I10 ESSENTIAL HYPERTENSION: ICD-10-CM

## 2019-06-24 DIAGNOSIS — I25.10 CORONARY ARTERY DISEASE INVOLVING NATIVE CORONARY ARTERY OF NATIVE HEART WITHOUT ANGINA PECTORIS: ICD-10-CM

## 2019-06-24 DIAGNOSIS — I35.1 NONRHEUMATIC AORTIC VALVE INSUFFICIENCY: Primary | ICD-10-CM

## 2019-06-24 DIAGNOSIS — I10 ESSENTIAL HYPERTENSION WITH GOAL BLOOD PRESSURE LESS THAN 130/80: ICD-10-CM

## 2019-06-24 DIAGNOSIS — I34.0 NON-RHEUMATIC MITRAL REGURGITATION: ICD-10-CM

## 2019-06-24 DIAGNOSIS — I35.0 NONRHEUMATIC AORTIC VALVE STENOSIS: ICD-10-CM

## 2019-06-24 DIAGNOSIS — R06.02 SOB (SHORTNESS OF BREATH): ICD-10-CM

## 2019-06-24 DIAGNOSIS — I71.9 AORTIC ANEURYSM WITHOUT RUPTURE, UNSPECIFIED PORTION OF AORTA (HCC): ICD-10-CM

## 2019-06-24 RX ORDER — LISINOPRIL 10 MG/1
10 TABLET ORAL DAILY
Qty: 90 TAB | Refills: 0 | Status: SHIPPED | OUTPATIENT
Start: 2019-06-24 | End: 2019-09-09 | Stop reason: SDUPTHER

## 2019-06-24 NOTE — PROGRESS NOTES
Chief Complaint   Patient presents with    Annual Exam    Valvular Heart Disease     AR     Visit Vitals  BP (!) 172/94 (BP 1 Location: Right arm, BP Patient Position: Sitting)   Pulse 64   Resp 16   Ht 5' 5\" (1.651 m)   Wt 195 lb (88.5 kg)   SpO2 98%   BMI 32.45 kg/m²     Patient presents in office with c/o CP with exertion. /90 in left arm    No refills needed at this time. No recent hospital visits. Medication changes made per VO of Dr. Rafiq Marin. START Lisinopril 10mg daily. CMRI ordered per VO of Dr. Rafiq Marin.

## 2019-06-24 NOTE — PROGRESS NOTES
Office Follow-up    NAME: Lito Honeycutt   :  1940  MRM:  [id number]    Date:  2019            Assessment:     Problem List  Date Reviewed: 2019          Codes Class Noted    CAD (coronary artery disease) ICD-10-CM: I25.10  ICD-9-CM: 414.00  2015        Chest pain ICD-10-CM: R07.9  ICD-9-CM: 786.50  2015        SOB (shortness of breath) ICD-10-CM: R06.02  ICD-9-CM: 786.05  2015                 Plan:     1. CAD/status post LAD TRELL stent ×2, probable LCx: Continue ASA and statins. 2. Moderate aortic regurgitation: The aortic regurgitation is stable. 3. Hypertension: Blood pressure was elevated today. We will add lisinopril 10 mg p.o. daily current regimen. 4. Mild aortic aneurysm: On his last cardiac MRI his ascending aorta was 41 mm. We will do another surveillance MRI in   5. Sleep apnea: Continue CPAP  6. Hyperlipidemia: Continue statins. 7. See Dr. Mynor Ceron in 1 year. Subjective:     Lito Honeycutt, a 67y.o. year-old who presents for followup. He has known history of CAD status post LAD TRELL stent ×2 and lipoma of the left circumflex artery. He is off of Plavix now. Today he underwent echocardiogram.  There is no change in his aortic regurgitation. Currently denies any symptoms of chest pain, shortness of breath, lightheadedness or dizziness. His blood pressure was significantly elevated today with systolic blood pressure of 174. Normally his blood pressure at home per his own account stays within the normal range. EKG in my office today demonstrated sinus bradycardia with heart rate of 54 bpm with normal axis with normal intervals but with normal ST segment. Exam:     Physical Exam:  There were no vitals taken for this visit.   General appearance - alert, well appearing, and in no distress  Mental status - affect appropriate to mood  Eyes - sclera anicteric, moist mucous membranes  Neck - supple, no significant adenopathy  Chest - clear to auscultation, no wheezes, rales or rhonchi  Heart - normal rate, regular rhythm, normal S1, S2, ESM grade 2/6 aortic region. No rubs, clicks or gallops  Abdomen - soft, nontender, nondistended, no masses or organomegaly  Extremities - peripheral pulses normal, no pedal edema  Skin - normal coloration  no rashes    Medications:     Current Outpatient Medications   Medication Sig    carvedilol (COREG) 12.5 mg tablet Take 1 Tab by mouth two (2) times a day.  colchicine 0.6 mg tablet Take 0.6 mg by mouth as needed.  glipiZIDE SR (GLUCOTROL XL) 2.5 mg CR tablet Take  by mouth daily.  cpap machine kit by Does Not Apply route.  fluticasone (FLONASE) 50 mcg/actuation nasal spray 2 Sprays by Both Nostrils route as needed for Rhinitis.  ACETAMINOPHEN/DIPHENHYDRAMINE (TYLENOL PM PO) Take  by mouth as needed.  aspirin 81 mg chewable tablet Take 1 Tab by mouth daily.  atorvastatin (LIPITOR) 10 mg tablet Take  by mouth daily. No current facility-administered medications for this visit. Diagnostic Data Review:       1/9/17: ECHO- LVEF 55%, mild hypokinesis of the basal-mid inferolateral wall(s), G1DD; Mild MR; AV leaflets exhibited sclerosis, mod AR; mild TR; mild MT    12/28/16: LEXISCAN- Normal Study LVEF 87%, no ischemic ST changes  CMRI 12/2/15    12/9/2015: CATH  --- S/p TRELL ( 2.25 x 23, 2.25 x 12 )-> mLAD, PTCA mLCX W/ residual 80-90% but improved flow in distal vess  11/18/15: STRESS NUC- EF 55%, inferolateral mod partially reversible defect w/ mid-mod carole-infarct ischemia   El  12/2/15: CARDIAC MRI- 1. Normal left ventricular cavity size with preserved left ventricular   systolic function. Moderate hypokinesis of the base to mid and distal   lateral wall. Thinning of the basal lateral wall. 3-D LVEF 62%. 2. Normal right ventricular size and systolic function. RVEF 55%. 3. Mild 1+ aortic regurgitation. 4.  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 There is only a thin   rim of viable myocardium subtending this infarct. The entire anterior wall,   anteroseptal wall, anteroapical wall, apex, inferior wall, inferoseptal wall   and inferolateral wall does not demonstrate any infarct and are completely   viable. Based on the viability study the LCx territory does not demonstrate   any significant viability and has a large infarct and will not recover upon   revascularization. The LAD territory and RCA territories demonstrate   significant viability and does not demonstrate any infarct. 5. Normal pleura and pericardium. There is no significant effusions. 6. Mildly dilated ascending aorta measuring 40 x 30 mm. 7. Possibly enlarged thyroid with possible thyroid nodule. Lab Review:     Lab Results   Component Value Date/Time    Cholesterol, Total 146 01/06/2016 07:49 AM     Lab Results   Component Value Date/Time    Creatinine 0.94 12/10/2015 03:00 AM     Lab Results   Component Value Date/Time    BUN 11 12/10/2015 03:00 AM     Lab Results   Component Value Date/Time    Potassium 4.5 12/10/2015 03:00 AM     No results found for: HBA1C, HGBE8, HGD7XNEL, RNI7UFMX  Lab Results   Component Value Date/Time    HGB 14.6 11/24/2015 07:45 AM     Lab Results   Component Value Date/Time    PLATELET 027 37/39/6839 07:45 AM     No results for input(s): CPK, CKMB, TROIQ in the last 72 hours. No lab exists for component: CKQMB, CPKMB             ___________________________________________________    Marychuy Guerrero.  Oksana El MD, [facility]

## 2019-06-24 NOTE — PATIENT INSTRUCTIONS
Cardiac MRI in 1 year for aortic aneurysm and aortic regurgitation. Lisinopril 10mg po daily. See Dr. Coral Brown in 1 year.

## 2019-07-08 ENCOUNTER — TELEPHONE (OUTPATIENT)
Dept: CARDIOLOGY CLINIC | Age: 79
End: 2019-07-08

## 2019-07-08 NOTE — TELEPHONE ENCOUNTER
[facility] scheduling called stating that the patient informed her he is claustrophobic and may need sedation, in that case a new order will have to be placed   Phone: [phone]

## 2019-07-08 NOTE — PROGRESS NOTES
Patient is claustrophobic. Per VO of Dr. Miguelina Rodriguez, patient to have CMRI in 1 year with conscious sedation. Testing has been reordered. Patient brought in Stent card. Will fax a copy to [employer].

## 2019-07-08 NOTE — TELEPHONE ENCOUNTER
Return call placed to [facility] with JOYCELYN. LVM: CMRI order changed d/t conscious sedation needed. Also, reiterated that CMRI is for next year. Copy of patient's stent card has been made and will fax once best fax # is received.

## 2019-09-10 RX ORDER — LISINOPRIL 10 MG/1
TABLET ORAL
Qty: 90 TAB | Refills: 3 | Status: SHIPPED | OUTPATIENT
Start: 2019-09-10 | End: 2020-09-25 | Stop reason: SDUPTHER

## 2019-09-10 NOTE — TELEPHONE ENCOUNTER
Refill of Lisinopril 10 mg daily completed per VO of Dr. Faisal Cates.     Last OV: 6/2019  Next OV: 6/2020

## 2020-05-04 ENCOUNTER — TELEPHONE (OUTPATIENT)
Dept: CARDIOLOGY CLINIC | Age: 80
End: 2020-05-04

## 2020-05-04 NOTE — TELEPHONE ENCOUNTER
Pt is calling in Ref to the MRI and he has not heard back from them about scheduling it [phone] or [phone]

## 2020-05-13 ENCOUNTER — HOSPITAL ENCOUNTER (OUTPATIENT)
Dept: MRI IMAGING | Age: 80
Discharge: HOME | End: 2020-05-13
Attending: INTERNAL MEDICINE
Payer: MEDICARE

## 2020-05-13 VITALS
HEIGHT: 65 IN | TEMPERATURE: 98.3 F | RESPIRATION RATE: 24 BRPM | SYSTOLIC BLOOD PRESSURE: 145 MMHG | OXYGEN SATURATION: 98 % | BODY MASS INDEX: 31.16 KG/M2 | WEIGHT: 187 LBS | DIASTOLIC BLOOD PRESSURE: 103 MMHG | HEART RATE: 65 BPM

## 2020-05-13 DIAGNOSIS — I35.0 NONRHEUMATIC AORTIC VALVE STENOSIS: ICD-10-CM

## 2020-05-13 DIAGNOSIS — I71.9 AORTIC ANEURYSM WITHOUT RUPTURE, UNSPECIFIED PORTION OF AORTA (HCC): ICD-10-CM

## 2020-05-13 DIAGNOSIS — I35.0 NONRHEUMATIC AORTIC (VALVE) STENOSIS: ICD-10-CM

## 2020-05-13 PROCEDURE — 99153 MOD SED SAME PHYS/QHP EA: CPT

## 2020-05-13 PROCEDURE — 74011250636 HC RX REV CODE- 250/636: Performed by: INTERNAL MEDICINE

## 2020-05-13 PROCEDURE — 99152 MOD SED SAME PHYS/QHP 5/>YRS: CPT

## 2020-05-13 PROCEDURE — 75557 CARDIAC MRI FOR MORPH: CPT

## 2020-05-13 RX ORDER — FENTANYL CITRATE 50 UG/ML
25-100 INJECTION, SOLUTION INTRAMUSCULAR; INTRAVENOUS
Status: DISCONTINUED | OUTPATIENT
Start: 2020-05-13 | End: 2020-05-13

## 2020-05-13 RX ORDER — MIDAZOLAM HYDROCHLORIDE 1 MG/ML
.5-5 INJECTION, SOLUTION INTRAMUSCULAR; INTRAVENOUS
Status: DISCONTINUED | OUTPATIENT
Start: 2020-05-13 | End: 2020-05-13

## 2020-05-13 RX ADMIN — MIDAZOLAM 1 MG: 1 INJECTION INTRAMUSCULAR; INTRAVENOUS at 09:32

## 2020-05-13 NOTE — DISCHARGE INSTRUCTIONS
Patient Education        Sedation for a Medical Procedure: Care Instructions  Your Care Instructions    For a minor procedure or surgery, you will get a sedative to help you relax. This drug will make you sleepy. It is usually given in a vein (by IV). It may be used with anesthesia. There are different types of anesthesia. You and your doctor or anesthesia specialist will work together to choose the best anesthesia for you. It is usually based on your health, the procedure, and your preference. · Local anesthesia is a shot given to numb a small part of the body. · Regional anesthesia is a shot that blocks pain to a larger area of the body. · General anesthesia affects the brain and the whole body. You get it through a small tube placed in a vein (IV). Or you may breathe it in. You are unconscious and will not feel pain. You may get monitored anesthesia care (MAC). This means that an anesthesia specialist will care for you during your surgery. He or she will make sure that you get only the level of anesthesia care you need to prevent pain for your specific case. If you had anesthesia, you may feel some pain and discomfort as it wears off. If you have pain, don't be afraid to say so. Pain medicine works better if you take it before the pain gets bad. Common side effects from sedation include:  · Feeling sleepy. (Your doctors and nurses will make sure you are not too sleepy to go home.)  · Nausea and vomiting. This usually does not last long. · Feeling tired. Follow-up care is a key part of your treatment and safety. Be sure to make and go to all appointments, and call your doctor if you are having problems. It's also a good idea to know your test results and keep a list of the medicines you take. How can you care for yourself at home? Activity    · Don't do anything for 24 hours that requires attention to detail. This includes going to work, making important decisions, or signing any legal documents.  It takes time for the medicine effects to completely wear off.     · For your safety, you should not drive or operate any machinery that could be dangerous until the medicine wears off and you can think clearly and react easily.     · When you get home, it is important to rest until the anesthesia has worn off. Some people will feel drowsy or dizzy for up to a few hours after leaving the hospital.     · Take your time and walk slowly. Sudden changes in position may also cause nausea.     · Rest when you feel tired. Getting enough sleep will help you recover. Diet    · You can eat your normal diet, unless your doctor gives you other instructions. If your stomach is upset, try clear liquids and bland, low-fat foods like plain toast or rice.     · Drink plenty of fluids (unless your doctor tells you not to).   · Don't drink alcohol for 24 hours. Medicines    · Be safe with medicines. Read and follow all instructions on the label. ? If the doctor gave you a prescription medicine for pain, take it as prescribed. ? If you are taking opioids for pain, it is very important to take them as prescribed. Opioids can easily be misused. Misuse can lead to opioid use disorder and even death. Because of this, it is best to get off them as soon as possible. As soon as you don't need them, talk to your doctor about how to safely stop taking them. Also talk with your doctor about how to safely store and get rid of opioids. ? If you are not taking a prescription pain medicine, ask your doctor if you can take an over-the-counter medicine.     · If you think your pain medicine is making you sick to your stomach, you can try these things. ? Take your medicine after meals (unless your doctor has told you not to). ? Ask your doctor for a different pain medicine. When should you call for help? Call 911 anytime you think you may need emergency care.  For example, call if:    · You have severe trouble breathing.     · You passed out (lost consciousness).    Call your doctor now or seek immediate medical care if:    · You have trouble breathing.     · You have ongoing or worsening nausea or vomiting.     · You have a fever.     · You have a new or worse headache.     · The medicine is not wearing off and you can't think clearly.    Watch closely for changes in your health, and be sure to contact your doctor if:    · You do not get better as expected. Where can you learn more? Go to http://chucky-jhonathan.info/  Enter G817 in the search box to learn more about \"Sedation for a Medical Procedure: Care Instructions. \"  Current as of: August 21, 2019Content Version: 12.4  © 4958-7966 [employer]. Care instructions adapted under license by [employer] (which disclaims liability or warranty for this information). If you have questions about a medical condition or this instruction, always ask your healthcare professional. [address] any warranty or liability for your use of this information.

## 2020-05-13 NOTE — PROGRESS NOTES
0740 Pt walk back from [facility] to [facility] holding for cardiac MRI. Pt A&x with no C/O pain, ASA 2 Airway 2 Lung sounds clear bi lat, S1 and S2 NSR bowel sounds present. PIV placed LFT AC #22 with positive blood return. Dr. Zeeshan Mg called by MRI team to consent pt. Pt resting on stretcher. 8376 Dr. Zeeshan Mg at bedside and informed consent obtained. 6290 Pt taken to MRI and placed on table and  monitor 0932 Time out performed and sedation started. Pt has call bell in hand. Total sedation given versed 1 mg. 1038 MRI study finished. Pt brought back to [facility] sitting up in stretcher drinking water and eating crackers. 80 Pt daughter called to  pt at 1115. 1105 Discharge paperwork given to pt he no questions or concerns. LFT #22 AC PIV removed. 200 Pt taken out via wheelchair to front [place] to his daughter.

## 2020-06-01 RX ORDER — CARVEDILOL 12.5 MG/1
TABLET ORAL
Qty: 180 TAB | Refills: 3 | Status: SHIPPED | OUTPATIENT
Start: 2020-06-01 | End: 2021-06-01

## 2020-06-25 ENCOUNTER — OFFICE VISIT (OUTPATIENT)
Dept: CARDIOLOGY CLINIC | Age: 80
End: 2020-06-25

## 2020-06-25 VITALS
OXYGEN SATURATION: 96 % | BODY MASS INDEX: 31.49 KG/M2 | HEART RATE: 63 BPM | DIASTOLIC BLOOD PRESSURE: 86 MMHG | SYSTOLIC BLOOD PRESSURE: 130 MMHG | WEIGHT: 189 LBS | HEIGHT: 65 IN

## 2020-06-25 DIAGNOSIS — I25.10 CORONARY ARTERY DISEASE INVOLVING NATIVE CORONARY ARTERY OF NATIVE HEART WITHOUT ANGINA PECTORIS: ICD-10-CM

## 2020-06-25 DIAGNOSIS — I71.9 AORTIC ANEURYSM WITHOUT RUPTURE, UNSPECIFIED PORTION OF AORTA (HCC): ICD-10-CM

## 2020-06-25 DIAGNOSIS — I10 ESSENTIAL HYPERTENSION: Primary | ICD-10-CM

## 2020-06-25 DIAGNOSIS — I35.1 NONRHEUMATIC AORTIC VALVE INSUFFICIENCY: ICD-10-CM

## 2020-06-25 NOTE — PROGRESS NOTES
Monalisa Musa is a 79 y.o. male    Chief Complaint   Patient presents with    Coronary Artery Disease    Hypertension    Other     JEANIE        Chest pain No    SOB No    Dizziness No    Swelling No    Refills No    Visit Vitals  /86 (BP 1 Location: Left arm, BP Patient Position: Sitting)   Pulse 63   Ht 5' 5\" (1.651 m)   Wt 189 lb (85.7 kg)   SpO2 96%   BMI 31.45 kg/m²       1. Have you been to the ER, urgent care clinic since your last visit? Hospitalized since your last visit? No    2. Have you seen or consulted any other health care providers outside of the [address] [place] [address] since your last visit? Include any pap smears or colon screening.   no

## 2020-06-25 NOTE — PROGRESS NOTES
Office Follow-up    NAME: Rios Lopez   :  1940  MRM:  [id number]    Date:  2020            Assessment:     Problem List  Date Reviewed: 2019          Codes Class Noted    CAD (coronary artery disease) ICD-10-CM: I25.10  ICD-9-CM: 414.00  2015        Chest pain ICD-10-CM: R07.9  ICD-9-CM: 786.50  2015        SOB (shortness of breath) ICD-10-CM: R06.02  ICD-9-CM: 786.05  2015                 Plan:     1. CAD/status post LAD TRELL stent ×2, probable LCx: Continue ASA and statins. 2. Moderate aortic regurgitation: The aortic regurgitation is stable. 3. Hypertension: Blood pressure was elevated today. We will add lisinopril 10 mg p.o. daily current regimen. 4. Mild aortic aneurysm: On his recent cardiac MRI his ascending aorta was 41 mm. Continue to monitor with 3 yearly CMRI. 5. Sleep apnea: Continue CPAP  6. Hyperlipidemia: Continue statins. 7. See Dr. Newby Has in 1 year. Subjective:     Rios Lopez, a 79y.o. year-old who presents for followup. He has known history of CAD status post LAD TRELL stent ×2 and lipoma of the left circumflex artery. Today he underwent echocardiogram.  There is no change in his aortic regurgitation. Currently denies any symptoms of chest pain, shortness of breath, lightheadedness or dizziness. EKG in my office today demonstrated sinus bradycardia with heart rate of 59 bpm with normal axis with normal intervals but with normal ST segment.      Exam:     Physical Exam:  Visit Vitals  /86 (BP 1 Location: Left arm, BP Patient Position: Sitting)   Pulse 63   Ht 5' 5\" (1.651 m)   Wt 189 lb (85.7 kg)   SpO2 96%   BMI 31.45 kg/m²     General appearance - alert, well appearing, and in no distress  Mental status - affect appropriate to mood  Eyes - sclera anicteric, moist mucous membranes  Neck - supple, no significant adenopathy  Chest - clear to auscultation, no wheezes, rales or rhonchi  Heart - normal rate, regular rhythm, normal S1, S2, ESM grade 2/6 aortic region. No rubs, clicks or gallops  Abdomen - soft, nontender, nondistended, no masses or organomegaly  Extremities - peripheral pulses normal, no pedal edema  Skin - normal coloration  no rashes    Medications:     Current Outpatient Medications   Medication Sig    carvediloL (COREG) 12.5 mg tablet TAKE 1 TABLET BY MOUTH TWICE A DAY    lisinopril (PRINIVIL, ZESTRIL) 10 mg tablet TAKE 1 TABLET BY MOUTH EVERY DAY    colchicine 0.6 mg tablet Take 0.6 mg by mouth as needed.  glipiZIDE SR (GLUCOTROL XL) 2.5 mg CR tablet Take 2.5 mg by mouth daily. 1 tablet in the am and one in the pm    cpap machine kit by Does Not Apply route.  fluticasone (FLONASE) 50 mcg/actuation nasal spray 2 Sprays by Both Nostrils route as needed for Rhinitis.  ACETAMINOPHEN/DIPHENHYDRAMINE (TYLENOL PM PO) Take  by mouth as needed.  aspirin 81 mg chewable tablet Take 1 Tab by mouth daily.  atorvastatin (LIPITOR) 10 mg tablet Take  by mouth daily. No current facility-administered medications for this visit. Diagnostic Data Review:       1/9/17: ECHO- LVEF 55%, mild hypokinesis of the basal-mid inferolateral wall(s), G1DD; Mild MR; AV leaflets exhibited sclerosis, mod AR; mild TR; mild NC    12/28/16: LEXISCAN- Normal Study LVEF 87%, no ischemic ST changes  CMRI 12/2/15    12/9/2015: CATH  --- S/p TRELL ( 2.25 x 23, 2.25 x 12 )-> mLAD, PTCA mLCX W/ residual 80-90% but improved flow in distal vess  11/18/15: STRESS NUC- EF 55%, inferolateral mod partially reversible defect w/ mid-mod carole-infarct ischemia   El  12/2/15: CARDIAC MRI- 1. Normal left ventricular cavity size with preserved left ventricular   systolic function. Moderate hypokinesis of the base to mid and distal   lateral wall. Thinning of the basal lateral wall. 3-D LVEF 62%. 2. Normal right ventricular size and systolic function. RVEF 55%. 3. Mild 1+ aortic regurgitation. 4.  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 There is only a thin   rim of viable myocardium subtending this infarct. The entire anterior wall,   anteroseptal wall, anteroapical wall, apex, inferior wall, inferoseptal wall   and inferolateral wall does not demonstrate any infarct and are completely   viable. Based on the viability study the LCx territory does not demonstrate   any significant viability and has a large infarct and will not recover upon   revascularization. The LAD territory and RCA territories demonstrate   significant viability and does not demonstrate any infarct. 5. Normal pleura and pericardium. There is no significant effusions. 6. Mildly dilated ascending aorta measuring 40 x 30 mm. 7. Possibly enlarged thyroid with possible thyroid nodule. Lab Review:     Lab Results   Component Value Date/Time    Cholesterol, Total 146 01/06/2016 07:49 AM     Lab Results   Component Value Date/Time    Creatinine 0.94 12/10/2015 03:00 AM     Lab Results   Component Value Date/Time    BUN 11 12/10/2015 03:00 AM     Lab Results   Component Value Date/Time    Potassium 4.5 12/10/2015 03:00 AM     No results found for: HBA1C, HGBE8, RUC4SMLO, HDB2RZWJ  Lab Results   Component Value Date/Time    HGB 14.6 11/24/2015 07:45 AM     Lab Results   Component Value Date/Time    PLATELET 419 63/98/3354 07:45 AM     No results for input(s): CPK, CKMB, TROIQ in the last 72 hours. No lab exists for component: CKQMB, CPKMB             ___________________________________________________    Eleuterio Parson.  Tawana Chahal MD, [facility]

## 2020-09-16 ENCOUNTER — HOSPITAL ENCOUNTER (INPATIENT)
Age: 80
LOS: 3 days | Discharge: HOME | DRG: 418 | End: 2020-09-19
Attending: EMERGENCY MEDICINE | Admitting: INTERNAL MEDICINE
Payer: MEDICARE

## 2020-09-16 ENCOUNTER — APPOINTMENT (OUTPATIENT)
Dept: ULTRASOUND IMAGING | Age: 80
DRG: 418 | End: 2020-09-16
Attending: EMERGENCY MEDICINE
Payer: MEDICARE

## 2020-09-16 ENCOUNTER — APPOINTMENT (OUTPATIENT)
Dept: MRI IMAGING | Age: 80
DRG: 418 | End: 2020-09-16
Attending: INTERNAL MEDICINE
Payer: MEDICARE

## 2020-09-16 DIAGNOSIS — K85.10 ACUTE GALLSTONE PANCREATITIS: Primary | ICD-10-CM

## 2020-09-16 DIAGNOSIS — I25.10 CORONARY ARTERY DISEASE INVOLVING NATIVE CORONARY ARTERY OF NATIVE HEART, ANGINA PRESENCE UNSPECIFIED: ICD-10-CM

## 2020-09-16 DIAGNOSIS — Z01.810 PREOP CARDIOVASCULAR EXAM: ICD-10-CM

## 2020-09-16 PROBLEM — K85.90 ACUTE PANCREATITIS: Status: ACTIVE | Noted: 2020-09-16

## 2020-09-16 PROBLEM — E11.9 DM (DIABETES MELLITUS) (HCC): Status: ACTIVE | Noted: 2020-09-16

## 2020-09-16 PROBLEM — I10 HTN (HYPERTENSION): Status: ACTIVE | Noted: 2020-09-16

## 2020-09-16 PROBLEM — K85.90 PANCREATITIS: Status: ACTIVE | Noted: 2020-09-16

## 2020-09-16 LAB
ALBUMIN SERPL-MCNC: 3.6 G/DL (ref 3.5–5)
ALBUMIN/GLOB SERPL: 0.9 {RATIO} (ref 1.1–2.2)
ALP SERPL-CCNC: 99 U/L (ref 45–117)
ALT SERPL-CCNC: 23 U/L (ref 12–78)
ANION GAP SERPL CALC-SCNC: 9 MMOL/L (ref 5–15)
AST SERPL-CCNC: 23 U/L (ref 15–37)
ATRIAL RATE: 72 BPM
BASOPHILS # BLD: 0 K/UL (ref 0–0.1)
BASOPHILS NFR BLD: 0 % (ref 0–1)
BILIRUB SERPL-MCNC: 1.2 MG/DL (ref 0.2–1)
BUN SERPL-MCNC: 15 MG/DL (ref 6–20)
BUN/CREAT SERPL: 14 (ref 12–20)
CALCIUM SERPL-MCNC: 8.5 MG/DL (ref 8.5–10.1)
CALCULATED P AXIS, ECG09: 3 DEGREES
CALCULATED R AXIS, ECG10: -15 DEGREES
CALCULATED T AXIS, ECG11: 8 DEGREES
CHLORIDE SERPL-SCNC: 103 MMOL/L (ref 97–108)
CO2 SERPL-SCNC: 20 MMOL/L (ref 21–32)
CREAT SERPL-MCNC: 1.05 MG/DL (ref 0.7–1.3)
DIAGNOSIS, 93000: NORMAL
DIFFERENTIAL METHOD BLD: ABNORMAL
EOSINOPHIL # BLD: 0 K/UL (ref 0–0.4)
EOSINOPHIL NFR BLD: 0 % (ref 0–7)
ERYTHROCYTE [DISTWIDTH] IN BLOOD BY AUTOMATED COUNT: 12.4 % (ref 11.5–14.5)
GLOBULIN SER CALC-MCNC: 4 G/DL (ref 2–4)
GLUCOSE BLD STRIP.AUTO-MCNC: 157 MG/DL (ref 65–100)
GLUCOSE BLD STRIP.AUTO-MCNC: 190 MG/DL (ref 65–100)
GLUCOSE BLD STRIP.AUTO-MCNC: 216 MG/DL (ref 65–100)
GLUCOSE SERPL-MCNC: 284 MG/DL (ref 65–100)
HCT VFR BLD AUTO: 43.4 % (ref 36.6–50.3)
HGB BLD-MCNC: 15 G/DL (ref 12.1–17)
IMM GRANULOCYTES # BLD AUTO: 0.2 K/UL (ref 0–0.04)
IMM GRANULOCYTES NFR BLD AUTO: 1 % (ref 0–0.5)
LIPASE SERPL-CCNC: >3000 U/L (ref 73–393)
LYMPHOCYTES # BLD: 1 K/UL (ref 0.8–3.5)
LYMPHOCYTES NFR BLD: 4 % (ref 12–49)
MCH RBC QN AUTO: 30.5 PG (ref 26–34)
MCHC RBC AUTO-ENTMCNC: 34.6 G/DL (ref 30–36.5)
MCV RBC AUTO: 88.4 FL (ref 80–99)
MONOCYTES # BLD: 1.4 K/UL (ref 0–1)
MONOCYTES NFR BLD: 6 % (ref 5–13)
NEUTS SEG # BLD: 21.5 K/UL (ref 1.8–8)
NEUTS SEG NFR BLD: 89 % (ref 32–75)
NRBC # BLD: 0 K/UL (ref 0–0.01)
NRBC BLD-RTO: 0 PER 100 WBC
P-R INTERVAL, ECG05: 154 MS
PLATELET # BLD AUTO: 247 K/UL (ref 150–400)
PMV BLD AUTO: 9.9 FL (ref 8.9–12.9)
POTASSIUM SERPL-SCNC: 4.3 MMOL/L (ref 3.5–5.1)
PROT SERPL-MCNC: 7.6 G/DL (ref 6.4–8.2)
Q-T INTERVAL, ECG07: 424 MS
QRS DURATION, ECG06: 108 MS
QTC CALCULATION (BEZET), ECG08: 464 MS
RBC # BLD AUTO: 4.91 M/UL (ref 4.1–5.7)
RBC MORPH BLD: ABNORMAL
SERVICE CMNT-IMP: ABNORMAL
SODIUM SERPL-SCNC: 132 MMOL/L (ref 136–145)
VENTRICULAR RATE, ECG03: 72 BPM
WBC # BLD AUTO: 24.1 K/UL (ref 4.1–11.1)

## 2020-09-16 PROCEDURE — 74011250636 HC RX REV CODE- 250/636: Performed by: INTERNAL MEDICINE

## 2020-09-16 PROCEDURE — 76705 ECHO EXAM OF ABDOMEN: CPT

## 2020-09-16 PROCEDURE — 80053 COMPREHEN METABOLIC PANEL: CPT

## 2020-09-16 PROCEDURE — 65270000029 HC RM PRIVATE

## 2020-09-16 PROCEDURE — 36415 COLL VENOUS BLD VENIPUNCTURE: CPT

## 2020-09-16 PROCEDURE — 74011000258 HC RX REV CODE- 258: Performed by: SURGERY

## 2020-09-16 PROCEDURE — 74011250637 HC RX REV CODE- 250/637: Performed by: INTERNAL MEDICINE

## 2020-09-16 PROCEDURE — 77030020847 HC STATLOK BARD -A

## 2020-09-16 PROCEDURE — 74181 MRI ABDOMEN W/O CONTRAST: CPT

## 2020-09-16 PROCEDURE — 83690 ASSAY OF LIPASE: CPT

## 2020-09-16 PROCEDURE — 93005 ELECTROCARDIOGRAM TRACING: CPT

## 2020-09-16 PROCEDURE — 85025 COMPLETE CBC W/AUTO DIFF WBC: CPT

## 2020-09-16 PROCEDURE — 99285 EMERGENCY DEPT VISIT HI MDM: CPT

## 2020-09-16 PROCEDURE — 74011250636 HC RX REV CODE- 250/636: Performed by: EMERGENCY MEDICINE

## 2020-09-16 PROCEDURE — 82962 GLUCOSE BLOOD TEST: CPT

## 2020-09-16 PROCEDURE — 77030041974 HC CATH SYS PERIPH TELE -B

## 2020-09-16 PROCEDURE — 74011636637 HC RX REV CODE- 636/637: Performed by: INTERNAL MEDICINE

## 2020-09-16 PROCEDURE — 74011250636 HC RX REV CODE- 250/636: Performed by: SURGERY

## 2020-09-16 RX ORDER — INSULIN LISPRO 100 [IU]/ML
INJECTION, SOLUTION INTRAVENOUS; SUBCUTANEOUS EVERY 6 HOURS
Status: DISCONTINUED | OUTPATIENT
Start: 2020-09-16 | End: 2020-09-19 | Stop reason: HOSPADM

## 2020-09-16 RX ORDER — FENTANYL CITRATE 50 UG/ML
25 INJECTION, SOLUTION INTRAMUSCULAR; INTRAVENOUS ONCE
Status: COMPLETED | OUTPATIENT
Start: 2020-09-16 | End: 2020-09-16

## 2020-09-16 RX ORDER — LORAZEPAM 2 MG/ML
1 INJECTION INTRAMUSCULAR ONCE
Status: COMPLETED | OUTPATIENT
Start: 2020-09-16 | End: 2020-09-16

## 2020-09-16 RX ORDER — FLUTICASONE PROPIONATE 50 MCG
2 SPRAY, SUSPENSION (ML) NASAL
Status: DISCONTINUED | OUTPATIENT
Start: 2020-09-16 | End: 2020-09-19 | Stop reason: HOSPADM

## 2020-09-16 RX ORDER — DEXTROSE 50 % IN WATER (D50W) INTRAVENOUS SYRINGE
12.5-25 AS NEEDED
Status: DISCONTINUED | OUTPATIENT
Start: 2020-09-16 | End: 2020-09-19 | Stop reason: HOSPADM

## 2020-09-16 RX ORDER — ENOXAPARIN SODIUM 100 MG/ML
40 INJECTION SUBCUTANEOUS EVERY 24 HOURS
Status: DISCONTINUED | OUTPATIENT
Start: 2020-09-16 | End: 2020-09-19 | Stop reason: HOSPADM

## 2020-09-16 RX ORDER — SODIUM CHLORIDE 0.9 % (FLUSH) 0.9 %
5-40 SYRINGE (ML) INJECTION AS NEEDED
Status: DISCONTINUED | OUTPATIENT
Start: 2020-09-16 | End: 2020-09-19 | Stop reason: HOSPADM

## 2020-09-16 RX ORDER — MAGNESIUM SULFATE 100 %
4 CRYSTALS MISCELLANEOUS AS NEEDED
Status: DISCONTINUED | OUTPATIENT
Start: 2020-09-16 | End: 2020-09-19 | Stop reason: HOSPADM

## 2020-09-16 RX ORDER — MORPHINE SULFATE 2 MG/ML
2 INJECTION, SOLUTION INTRAMUSCULAR; INTRAVENOUS
Status: DISCONTINUED | OUTPATIENT
Start: 2020-09-16 | End: 2020-09-19 | Stop reason: HOSPADM

## 2020-09-16 RX ORDER — SODIUM CHLORIDE 0.9 % (FLUSH) 0.9 %
5-40 SYRINGE (ML) INJECTION EVERY 8 HOURS
Status: DISCONTINUED | OUTPATIENT
Start: 2020-09-16 | End: 2020-09-19 | Stop reason: HOSPADM

## 2020-09-16 RX ORDER — HYDRALAZINE HYDROCHLORIDE 20 MG/ML
10 INJECTION INTRAMUSCULAR; INTRAVENOUS
Status: DISCONTINUED | OUTPATIENT
Start: 2020-09-16 | End: 2020-09-19 | Stop reason: HOSPADM

## 2020-09-16 RX ORDER — ACETAMINOPHEN 500 MG
500 TABLET ORAL
COMMUNITY

## 2020-09-16 RX ORDER — LISINOPRIL 5 MG/1
10 TABLET ORAL DAILY
Status: DISCONTINUED | OUTPATIENT
Start: 2020-09-17 | End: 2020-09-17

## 2020-09-16 RX ORDER — ACETAMINOPHEN 325 MG/1
650 TABLET ORAL
Status: DISCONTINUED | OUTPATIENT
Start: 2020-09-16 | End: 2020-09-19 | Stop reason: HOSPADM

## 2020-09-16 RX ORDER — HYDRALAZINE HYDROCHLORIDE 20 MG/ML
10 INJECTION INTRAMUSCULAR; INTRAVENOUS ONCE
Status: COMPLETED | OUTPATIENT
Start: 2020-09-16 | End: 2020-09-16

## 2020-09-16 RX ORDER — CARVEDILOL 12.5 MG/1
12.5 TABLET ORAL 2 TIMES DAILY WITH MEALS
Status: DISCONTINUED | OUTPATIENT
Start: 2020-09-16 | End: 2020-09-19 | Stop reason: HOSPADM

## 2020-09-16 RX ORDER — ONDANSETRON 2 MG/ML
4 INJECTION INTRAMUSCULAR; INTRAVENOUS
Status: DISCONTINUED | OUTPATIENT
Start: 2020-09-16 | End: 2020-09-19 | Stop reason: HOSPADM

## 2020-09-16 RX ORDER — SODIUM CHLORIDE, SODIUM LACTATE, POTASSIUM CHLORIDE, CALCIUM CHLORIDE 600; 310; 30; 20 MG/100ML; MG/100ML; MG/100ML; MG/100ML
125 INJECTION, SOLUTION INTRAVENOUS CONTINUOUS
Status: DISCONTINUED | OUTPATIENT
Start: 2020-09-16 | End: 2020-09-18

## 2020-09-16 RX ADMIN — PIPERACILLIN AND TAZOBACTAM 3.38 G: 3; .375 INJECTION, POWDER, LYOPHILIZED, FOR SOLUTION INTRAVENOUS at 23:43

## 2020-09-16 RX ADMIN — FENTANYL CITRATE 25 MCG: 50 INJECTION, SOLUTION INTRAMUSCULAR; INTRAVENOUS at 11:33

## 2020-09-16 RX ADMIN — MORPHINE SULFATE 2 MG: 2 INJECTION, SOLUTION INTRAMUSCULAR; INTRAVENOUS at 15:12

## 2020-09-16 RX ADMIN — INSULIN LISPRO 3 UNITS: 100 INJECTION, SOLUTION INTRAVENOUS; SUBCUTANEOUS at 14:03

## 2020-09-16 RX ADMIN — SODIUM CHLORIDE, SODIUM LACTATE, POTASSIUM CHLORIDE, AND CALCIUM CHLORIDE 125 ML/HR: 600; 310; 30; 20 INJECTION, SOLUTION INTRAVENOUS at 12:51

## 2020-09-16 RX ADMIN — HYDRALAZINE HYDROCHLORIDE 10 MG: 20 INJECTION INTRAMUSCULAR; INTRAVENOUS at 14:02

## 2020-09-16 RX ADMIN — INSULIN LISPRO 2 UNITS: 100 INJECTION, SOLUTION INTRAVENOUS; SUBCUTANEOUS at 18:20

## 2020-09-16 RX ADMIN — Medication 10 ML: at 13:46

## 2020-09-16 RX ADMIN — ENOXAPARIN SODIUM 40 MG: 40 INJECTION SUBCUTANEOUS at 21:37

## 2020-09-16 RX ADMIN — LORAZEPAM 1 MG: 2 INJECTION INTRAMUSCULAR; INTRAVENOUS at 18:33

## 2020-09-16 RX ADMIN — FLUTICASONE PROPIONATE 2 SPRAY: 50 SPRAY, METERED NASAL at 18:19

## 2020-09-16 RX ADMIN — PIPERACILLIN AND TAZOBACTAM 3.38 G: 3; .375 INJECTION, POWDER, LYOPHILIZED, FOR SOLUTION INTRAVENOUS at 17:44

## 2020-09-16 RX ADMIN — SODIUM CHLORIDE, SODIUM LACTATE, POTASSIUM CHLORIDE, AND CALCIUM CHLORIDE 125 ML/HR: 600; 310; 30; 20 INJECTION, SOLUTION INTRAVENOUS at 21:37

## 2020-09-16 RX ADMIN — Medication 10 ML: at 21:37

## 2020-09-16 RX ADMIN — SODIUM CHLORIDE 1000 ML: 900 INJECTION, SOLUTION INTRAVENOUS at 11:32

## 2020-09-16 NOTE — PROGRESS NOTES
Admission Medication Reconciliation:     Information obtained from:    Patient via phone call to room [address]:  YES    Comments/Recommendations:   Patient able to confirm name, , allergies, and preferred pharmacy  Updated PTA medication list  Denies recent changes to home medication regimen. Reports compliance to prescribed regimen       ¹[employer] pharmacy benefit data reflects medications filled and processed through the patient's insurance, however   this data does NOT capture whether the medication was picked up or is currently being taken by the patient. Prior to Admission Medications   Prescriptions Last Dose Informant Taking?   acetaminophen (TYLENOL) 500 mg tablet  Self Yes   Sig: Take 500 mg by mouth every six (6) hours as needed for Pain. aspirin 81 mg chewable tablet 9/15/2020 at Unknown time Self Yes   Sig: Take 1 Tab by mouth daily. atorvastatin (LIPITOR) 10 mg tablet 9/15/2020 at Unknown time Self Yes   Sig: Take 10 mg by mouth nightly. carvediloL (COREG) 12.5 mg tablet 2020 at Unknown time Self Yes   Sig: TAKE 1 TABLET BY MOUTH TWICE A DAY   colchicine 0.6 mg tablet  at Unknown time Self Yes   Sig: Take 0.6 mg by mouth as needed for Gout. fluticasone (FLONASE) 50 mcg/actuation nasal spray 2020 at Unknown time Self Yes   Si Sprays by Both Nostrils route as needed for Rhinitis. glipiZIDE SR (GLUCOTROL XL) 2.5 mg CR tablet 2020 at Unknown time Self Yes   Sig: Take 2.5 mg by mouth two (2) times daily (with meals). lisinopril (PRINIVIL, ZESTRIL) 10 mg tablet 9/15/2020 at Unknown time Self Yes   Sig: TAKE 1 TABLET BY MOUTH EVERY DAY      Facility-Administered Medications: None         Please contact the main inpatient pharmacy with any questions or concerns at [phone] and we will direct you to the clinical pharmacist covering this patient's care while in-house.    Chema Fowler, AngelaD, BCPS

## 2020-09-16 NOTE — PROGRESS NOTES
Advance Care Planning (ACP) Provider Note - Comprehensive      Date of ACP Conversation:  09/16/20    Persons included in Conversation:  patient   Length of ACP Conversation in minutes:  16 minutes     Authorized Decision Maker (if patient is incapable of making informed decisions): This person is: Mr Sanam Rogers for ALL Patients with Decision Making Capacity:  Importance of advance care planning, including choosing a healthcare agent to communicate patient's healthcare decisions if patient lost the ability to make decisions. Review of Existing Advance Directive:  Patient and family do not have an advance directive readily available for review. Active Diagnoses:   Gallstone pancreatitis    These active diagnoses are of sufficient risk that focused discussion on advance care planning is indicated in order to allow the patient to thoughtfully consider personal goals of care; and, if situations arise that prevent the ability to personally give input, to ensure appropriate representation of their personal desires for different levels and aggressiveness of care. For Serious or Chronic Illness:  Understanding of medical condition     Reviewed pt's clinical status. Anay Godwin has multiple medical problems including DM, HTN, hyperlipidemia, ELLYN, obesity  and is being admitted for gallstone pancreatitis. We reviewed our treatment plan and anticipated discharge plans. Further, we discussed pt's wishes on how  he would like to proceed (aggressive/heroic resuscitation vs not intervening and allowing nature takes its course) if  he were to suffer cardiopulmonary arrest.    Understanding of CPR, goals and expected outcomes, benefits and burdens discussed. Information on CPR success provided (many factors lower a patients chance of survival, including advanced age, performance status, malignancy, and presence of multiple comorbidities);  Individual asked to communicate understanding of information in his/her own words. CPR works best to restart the heart when there is a sudden event, like a heart attack, in someone who is otherwise healthy. Unfortunately, CPR does not typically restart the heart for people who have serious health conditions or who are very sick. \"     \"In the event your heart stopped as a result of an underlying serious health condition, would you want attempts to be made to restart your heart (answer \"yes\" for attempt to resuscitate) or would you prefer a natural death (answer \"no\" for do not attempt to resuscitate)? \" yes    Patient made it very clear to me that  he would like to be resuscited in the event of cardiopulmonary arrest, including chest compressions, defibrillation, intubation/mechanical ventilation.       Interventions Provided:  Referral made for ACP follow-up assistance to: Palliative care

## 2020-09-16 NOTE — CONSULTS
[facility]. Toya Stephen M.D.  [phone]                    GASTROENTEROLOGY CONSULTATION NOTE              NAME:  Noemy Farah   :   1940   MRN:   [id number]       Referring Physician:    Dr. Mikie Bowman Date:   2020 2:32 PM    Chief Complaint:    Acute pancreatitis     History of Present Illness:    Patient is a 79 y.o. who presented to the ER with acute onset abdominal pain that started yesterday morning and persisted, severe, in the mid epigastric area mainly and then through upper abdomen, without radiation. Denies nausea or vomiting. Denies any change in bowel habits or bleeding. He denies any previous similar attacks. Labs in the ER show elevated lipase and ultrasound showed gallstones without dilated bile ducts. He reports he has [facility].     PMH:  Past Medical History:   Diagnosis Date    HTN (hypertension)     Hyperlipemia     Kidney stone        PSH:  Past Surgical History:   Procedure Laterality Date    HX HERNIA REPAIR         Allergies: Allergies   Allergen Reactions    Sulfa (Sulfonamide Antibiotics) Unknown (comments)       Home Medications:  Prior to Admission Medications   Prescriptions Last Dose Informant Patient Reported? Taking?   acetaminophen (TYLENOL) 500 mg tablet  Self Yes Yes   Sig: Take 500 mg by mouth every six (6) hours as needed for Pain. aspirin 81 mg chewable tablet 9/15/2020 at Unknown time Self Yes Yes   Sig: Take 1 Tab by mouth daily. atorvastatin (LIPITOR) 10 mg tablet 9/15/2020 at Unknown time Self Yes Yes   Sig: Take 10 mg by mouth nightly. carvediloL (COREG) 12.5 mg tablet 2020 at Unknown time Self No Yes   Sig: TAKE 1 TABLET BY MOUTH TWICE A DAY   colchicine 0.6 mg tablet  at Unknown time Self Yes Yes   Sig: Take 0.6 mg by mouth as needed for Gout.    fluticasone (FLONASE) 50 mcg/actuation nasal spray 2020 at Unknown time Self Yes Yes   Si Sprays by Both Nostrils route as needed for Rhinitis. glipiZIDE SR (GLUCOTROL XL) 2.5 mg CR tablet 2020 at Unknown time Self Yes Yes   Sig: Take 2.5 mg by mouth two (2) times daily (with meals). lisinopril (PRINIVIL, ZESTRIL) 10 mg tablet 9/15/2020 at Unknown time Self No Yes   Sig: TAKE 1 TABLET BY MOUTH EVERY DAY      Facility-Administered Medications: None       Hospital Medications:  Current Facility-Administered Medications   Medication Dose Route Frequency    carvediloL (COREG) tablet 12.5 mg  12.5 mg Oral BID WITH MEALS    [START ON 2020] lisinopriL (PRINIVIL, ZESTRIL) tablet 10 mg  10 mg Oral DAILY    sodium chloride (NS) flush 5-40 mL  5-40 mL IntraVENous Q8H    sodium chloride (NS) flush 5-40 mL  5-40 mL IntraVENous PRN    enoxaparin (LOVENOX) injection 40 mg  40 mg SubCUTAneous Q24H    lactated Ringers infusion  125 mL/hr IntraVENous CONTINUOUS    morphine injection 2 mg  2 mg IntraVENous Q4H PRN    ondansetron (ZOFRAN) injection 4 mg  4 mg IntraVENous Q6H PRN    insulin lispro (HUMALOG) injection   SubCUTAneous Q6H    glucose chewable tablet 16 g  4 Tab Oral PRN    dextrose (D50W) injection syrg 12.5-25 g  12.5-25 g IntraVENous PRN    glucagon (GLUCAGEN) injection 1 mg  1 mg IntraMUSCular PRN    LORazepam (ATIVAN) injection 1 mg  1 mg IntraVENous ONCE    hydrALAZINE (APRESOLINE) 20 mg/mL injection 10 mg  10 mg IntraVENous Q6H PRN       Social History:  Social History     Tobacco Use    Smoking status: Former Smoker     Types: Pipe     Last attempt to quit: 2014     Years since quittin.7    Smokeless tobacco: Never Used   Substance Use Topics    Alcohol use:  Yes     Alcohol/week: 0.0 standard drinks       Family History:  Family History   Problem Relation Age of Onset    Colon Cancer Mother     Heart Attack Father     Coronary Artery Disease Father     Other Father         Angina    Cancer Sister        Review of Systems:  Constitutional: negative fever, negative chills, negative weight loss  Eyes:   negative visual changes  ENT:   negative sore throat, tongue or lip swelling  Respiratory:  negative cough, negative dyspnea  Cards:  negative for chest pain, palpitations, lower extremity edema  GI:   See HPI  :  negative for frequency, dysuria  Integument:  negative for rash and pruritus  Heme:  negative for easy bruising and gum/nose bleeding  Musculoskel: negative for myalgias,  back pain and muscle weakness  Neuro: negative for headaches, dizziness, vertigo  Psych:  negative for feelings of anxiety, depression     Objective:     Patient Vitals for the past 8 hrs:   BP Temp Pulse Resp SpO2 Height Weight   09/16/20 1419 (!) 167/72  77       09/16/20 1413 (!) 147/75  76       09/16/20 1409 (!) 170/48  70       09/16/20 1406 (!) 184/80  68       09/16/20 1337 (!) 177/88 97.2 °F (36.2 °C) 75 16 96 %     09/16/20 1245 (!) 170/75 98.5 °F (36.9 °C)   99 %     09/16/20 1230 (!) 183/65    94 %     09/16/20 1200 (!) 165/64    97 %     09/16/20 1137 (!) 179/76    96 %     09/16/20 0945 (!) 156/48    95 %     09/16/20 0828 (!) 183/88 98.2 °F (36.8 °C) 86 16 98 % 5' 5\" (1.651 m) 85.7 kg (189 lb)     No intake/output data recorded. No intake/output data recorded. EXAM:     NEURO-alert, oriented x3, affect appropriate, no focal neurological deficits, moves all extremities well, no involuntary movements   HEENT-Head: Normocephalic, no lesions, without obvious abnormality. LUNGS-clear to auscultation bilaterally    COR-regular rate and rhythym     ABD- soft, mildly tender over epigastric area. Bowel sounds hypoactive.  No masses,  no organomegaly     EXT-no edema    Skin - No rash     Data Review     Recent Labs     09/16/20 0905   WBC 24.1*   HGB 15.0   HCT 43.4        Recent Labs     09/16/20 0905   *   K 4.3      CO2 20*   BUN 15   CREA 1.05   *   CA 8.5     Recent Labs     09/16/20  0905   AP 99   TP 7.6   ALB 3.6   GLOB 4.0   LPSE >3,000*     No results for input(s): INR, PTP, APTT, INREXT in the last 72 hours. Patient Active Problem List   Diagnosis Code    CAD (coronary artery disease) I25.10    Chest pain R07.9    SOB (shortness of breath) R06.02    Acute pancreatitis K85.90    HTN (hypertension) I10    DM (diabetes mellitus) ([facility] [address] E11.9    Pancreatitis K85.90       Assessment and Plan:  Acute gallstone pancreatitis with leukocytosis without evidence of dilated ducts or cholangitis. Clinically better. Minimal elevation of bilirubin, possibly related to sludge passage however can not rule out remaining choledocholithiasis. MRCP ordered for this afternoon. Agree with surgery consult. Continue with supportive care, IV fluids, NPO and adequate pain management. Continue to monitor labs. Following. Thanks for allowing me to participate in the care of this patient.   Signed By: Kirill Chacon MD     9/16/2020  2:32 PM

## 2020-09-16 NOTE — PROGRESS NOTES
9/16/2020  12:41 PM    Case management note    Reason for Admission:   Acute pancreatits    Face to face/ all masked  Daughter at [facility] 54   Patient came to hospital for abdominal pain. He is admitted for pancreatitis. Patient has history of DM, HTN and HLD. [facility] [place] [facility]  Patient lives alone and is independent with ADL's                   RUR Score:          7%           Plan for utilizing home health:      Patient is independent and drives, will most likely not qualify for home health services    PCP: First and Last name:  Dr. Yoly Ge   Name of Practice:    Are you a current patient: Yes/No: yes   Approximate date of last visit: 2 months   Can you participate in a virtual visit with your PCP: yes                    Current Advanced Directive/Advance Care Plan: yes, not on file. ACP note to chart                         Transition of Care Plan:             1. Home with family assistance  2. PCP follow up  3.  CM to follow for discharge needs    Care Management Interventions  PCP Verified by CM: Yes(Dr. Yusef Judd)  Mode of Transport at Discharge: Self  Current Support Network: Lives Alone  Confirm Follow Up Transport: Family  The Plan for Transition of Care is Related to the Following Treatment Goals : acute pancreatitis  Discharge Location  Discharge Placement: Home with family assistance  Taylor Dey

## 2020-09-16 NOTE — ED NOTES
MRI checklist complete and at bedside. Patient states he will need to be sedated for MRI. Message sent to Dr Daniel Palacios via "[employer].

## 2020-09-16 NOTE — ACP (ADVANCE CARE PLANNING)
9/16/2020  12:46 PM    Advance Care Planning     Advance Care Planning Activator (Inpatient)  Conversation Note      Date of ACP Conversation: 09/16/20     Conversation Conducted with:   Patient with Decision Making Capacity    ACP Activator: [address] Decision Maker:    Current Designated Health Care Decision Maker:   Primary Decision Maker: Neema Blanca Daughter - [phone]    Secondary Decision Maker: Bartolo Fabian - Child - [phone]      Care Preferences    Ventilation: \"If you were in your present state of health and suddenly became very ill and were unable to breathe on your own, what would your preference be about the use of a ventilator (breathing machine) if it were available to you? \"      If patient would desire the use of a ventilator (breathing machine), answer \"yes\", if not \"no\":yes    \"If your health worsens and it becomes clear that your chance of recovery is unlikely, what would your preference be about the use of a ventilator (breathing machine) if it were available to you? \"     Would the patient desire the use of a ventilator (breathing machine)? NO      Resuscitation  \"CPR works best to restart the heart when there is a sudden event, like a heart attack, in someone who is otherwise healthy. Unfortunately, CPR does not typically restart the heart for people who have serious health conditions or who are very sick. \"    \"In the event your heart stopped as a result of an underlying serious health condition, would you want attempts to be made to restart your heart (answer \"yes\" for attempt to resuscitate) or would you prefer a natural death (answer \"no\" for do not attempt to resuscitate)? \" yes        [x] Yes  [] No   Educated Patient /regarding differences between Advance Directives and portable DNR orders.     Length of ACP Conversation in minutes:  15 minutes    Conversation Outcomes:  [x] ACP discussion completed  [x] Existing advance directive reviewed with patient; no changes to patient's previously recorded wishes     [] New Advance Directive completed   [] Portable Do Not Resuscitate prepared for Provider review and signature  [] POLST/POST/MOLST/MOST prepared for Provider review and signature      Follow-up plan:    [] Schedule follow-up conversation to continue planning  [] Referred individual to Provider for additional questions/concerns   [] Advised patient/agent/surrogate to review completed ACP document and update if needed with changes in condition, patient preferences or care setting     [] This note routed to one or more involved healthcare providers

## 2020-09-16 NOTE — ED NOTES
Ultrasound IV by ED Staff Procedure Note    Patient meets criteria for US IV insertion. Ultrasound IV verbal education provided to patient. Opportunities for questions given. IV ultrasound technique used for PIV placement:  20 gauge arrow endurance   LAC location. 1 X Attempt(s). Procedure tolerated well. Primary RN aware of IV placement and added to LDA.                                 [place]

## 2020-09-16 NOTE — CONSULTS
Assessment:   78 yo man with gallstone pancreatitis      Plan:   MRCP scheduled tonight  NPO  Repeat labs in am  Will plan for cholecystectomy likely Friday if pancreatitis resolved       Signed By: Hollie Pike MD  [facility]  [phone]    2020          General Surgery Consult    Subjective:      Ame Pizarro is a 79 y.o.  male who presents with a 5 day history  of abdominal pain. He reports that he had worsening pain since MOnday. He has been unable to sleep due to the pain. He had an episode of nausea with vomiting  On Tuesday and has not had any PO intake since that time. He reports that he has had multiple episodes in the past of abdominal pain with bloating but the episodes have all resolved within hours none have lasted as long as the current episode. He has never sought care during the other episodes. He denies fevers but reports chills over the past several days. He had loose stool on Tuesday but none since. Past Medical History:   Diagnosis Date    HTN (hypertension)     Hyperlipemia     Kidney stone      Past Surgical History:   Procedure Laterality Date    HX HERNIA REPAIR        Family History   Problem Relation Age of Onset    Colon Cancer Mother     Heart Attack Father     Coronary Artery Disease Father     Other Father         Angina    Cancer Sister      Social History     Socioeconomic History    Marital status:      Spouse name: Not on file    Number of children: Not on file    Years of education: Not on file    Highest education level: Not on file   Tobacco Use    Smoking status: Former Smoker     Types: Pipe     Last attempt to quit: 2014     Years since quittin.7    Smokeless tobacco: Never Used   Substance and Sexual Activity    Alcohol use:  Yes     Alcohol/week: 0.0 standard drinks      Current Facility-Administered Medications   Medication Dose Route Frequency    carvediloL (COREG) tablet 12.5 mg 12.5 mg Oral BID WITH MEALS    [START ON 9/17/2020] lisinopriL (PRINIVIL, ZESTRIL) tablet 10 mg  10 mg Oral DAILY    sodium chloride (NS) flush 5-40 mL  5-40 mL IntraVENous Q8H    sodium chloride (NS) flush 5-40 mL  5-40 mL IntraVENous PRN    enoxaparin (LOVENOX) injection 40 mg  40 mg SubCUTAneous Q24H    lactated Ringers infusion  125 mL/hr IntraVENous CONTINUOUS    morphine injection 2 mg  2 mg IntraVENous Q4H PRN    ondansetron (ZOFRAN) injection 4 mg  4 mg IntraVENous Q6H PRN    insulin lispro (HUMALOG) injection   SubCUTAneous Q6H    glucose chewable tablet 16 g  4 Tab Oral PRN    dextrose (D50W) injection syrg 12.5-25 g  12.5-25 g IntraVENous PRN    glucagon (GLUCAGEN) injection 1 mg  1 mg IntraMUSCular PRN    LORazepam (ATIVAN) injection 1 mg  1 mg IntraVENous ONCE    hydrALAZINE (APRESOLINE) 20 mg/mL injection 10 mg  10 mg IntraVENous Q6H PRN    piperacillin-tazobactam (ZOSYN) 3.375 g in 0.9% sodium chloride (MBP/ADV) 100 mL  3.375 g IntraVENous ONCE      Allergies   Allergen Reactions    Sulfa (Sulfonamide Antibiotics) Unknown (comments)       Review of Systems:     []     Unable to obtain  ROS due to  []    mental status change  []    sedated   []    intubated   [x]    Total of 12 system negative, unless specified below or in HPI:  Constitutional: negative fever, negative chills, negative weight loss  Eyes:   negative visual changes  ENT:   negative sore throat, tongue or lip swelling  Respiratory:  negative cough, negative dyspnea  Cards:  negative for chest pain, palpitations, lower extremity edema  GI:   positive for nausea, vomiting, diarrhea, and abdominal pain  :  negative for frequency, dysuria  Integument:  negative for rash and pruritus  Heme:  negative for easy bruising and gum/nose bleeding  Musculoskel: negative for myalgias,  back pain and muscle weakness  Neuro:  negative for headaches, dizziness, vertigo  Psych:  negative for feelings of anxiety, depression     Objective: Patient Vitals for the past 8 hrs:   BP Temp Pulse Resp SpO2   20 1442 (!) 166/80  86     20 1419 (!) 167/72  77     20 1413 (!) 147/75  76     20 1409 (!) 170/48  70     20 1406 (!) 184/80  68     20 1337 (!) 177/88 97.2 °F (36.2 °C) 75 16 96 %   20 1245 (!) 170/75 98.5 °F (36.9 °C)   99 %   20 1230 (!) 183/65    94 %   20 1200 (!) 165/64    97 %   20 1137 (!) 179/76    96 %   20 0945 (!) 156/48    95 %       Temp (24hrs), Av °F (36.7 °C), Min:97.2 °F (36.2 °C), Max:98.5 °F (36.9 °C)      Physical Exam:  General:  Alert, cooperative, no distress, appears stated age. Eyes:  Conjunctivae clear. PERRL, EOMs intact. Nose: Nares normal. Septum midline. Mucosa normal. No drainage or sinus tenderness. Mouth/Throat: Lips, mucosa, and tongue normal. Teeth and gums normal.   Neck: Supple, symmetrical, trachea midline   Back:   Symmetric, no curvature. ROM normal. No CVA tenderness. Lungs:   Clear to auscultation bilaterally. Heart:  Regular rate and rhythm   Abdomen:   Soft diffuse mild tenderness, no rebound or gaurding reducible umbilical hernia   Extremities: Extremities normal, atraumatic, no cyanosis or edema. Pulses: 2+ and symmetric all extremities. Skin: Skin color, texture, turgor normal. No rashes or lesions         Labs:   Recent Labs     20  0905   WBC 24.1*   HGB 15.0   HCT 43.4        Recent Labs     20  0905   *   K 4.3      CO2 20*   *   BUN 15   CREA 1.05   CA 8.5   ALB 3.6   TBILI 1.2*   ALT 23     No results for input(s): INR, INREXT in the last 72 hours.

## 2020-09-16 NOTE — PROGRESS NOTES
1340: Patient received from the ER. Patient NPO. Did not complete STAND. Patient reports no difficulty swallowing. Patient blood pressure 177/88. Patient reports not taking his medication for the past couple of days. Does not like to take on an empty stomach. Notified Dr. Zach Rodriguez. Orders for hydralazine one time. Patient scheduled for MRI at 5PM. Reports he requires medication as he is claustrophobic. Notified. Orders to follow. 1510: Patient with 10/10 pain to abdomen. Morphine given. 1755: Patient with complaint of headache. Feels like it is his allergies. Spoke to Dr. Kelle Styles. Orders for tylenol and flonase. 1930: Bedside shift change report given to [address] (oncoming nurse) by Cleve Tovar RN (offgoing nurse). Report included the following information SBAR, Kardex, Intake/Output, MAR and Recent Results.

## 2020-09-16 NOTE — H&P
[address]  [address] [place] [address]  [phone]    Admission History and Physical      NAME:  Fouzia Hutchinson   :   1940   MRN:  [id number]     PCP:  Shivam Soni MD     Date of service:  2020         Subjective:     CHIEF COMPLAINT: Abdominal pain, nausea    HISTORY OF PRESENT ILLNESS:     Mr. April Christie is a 79 y.o.  male who is admitted with gallstone pancreatitis. Mr. April Christie with past medical history of diabetes mellitus, hypertension, hyperlipidemia, ELLYN, obesity presented to ER complaining of abdominal pain which started yesterday. The pain is more on the epigastric and right upper quadrant area, it is severe in intensity, sharp, without radiation. Pain is associated with nausea but no vomiting. Denies fever. Patient drinks alcohol occasionally. Past Medical History:   Diagnosis Date    HTN (hypertension)     Hyperlipemia     Kidney stone         Past Surgical History:   Procedure Laterality Date    HX HERNIA REPAIR         Social History     Tobacco Use    Smoking status: Former Smoker     Types: Pipe     Last attempt to quit: 2014     Years since quittin.7    Smokeless tobacco: Never Used   Substance Use Topics    Alcohol use: Yes     Alcohol/week: 0.0 standard drinks        Family History   Problem Relation Age of Onset    Colon Cancer Mother     Heart Attack Father     Coronary Artery Disease Father     Other Father         Angina    Cancer Sister         Allergies   Allergen Reactions    Sulfa (Sulfonamide Antibiotics) Unknown (comments)        Prior to Admission medications    Medication Sig Start Date End Date Taking?  Authorizing Provider   carvediloL (COREG) 12.5 mg tablet TAKE 1 TABLET BY MOUTH TWICE A DAY 20   Agustina PRABHAKAR NP   lisinopril (PRINIVIL, ZESTRIL) 10 mg tablet TAKE 1 TABLET BY MOUTH EVERY DAY 9/10/19   Fer Kaur MD   colchicine 0.6 mg tablet Take 0.6 mg by mouth as needed. Provider, Historical   glipiZIDE SR (GLUCOTROL XL) 2.5 mg CR tablet Take 2.5 mg by mouth daily. 1 tablet in the am and one in the pm    Provider, Historical   cpap machine kit by Does Not Apply route. Provider, Historical   fluticasone (FLONASE) 50 mcg/actuation nasal spray 2 Sprays by Both Nostrils route as needed for Rhinitis. Provider, Historical   ACETAMINOPHEN/DIPHENHYDRAMINE (TYLENOL PM PO) Take  by mouth as needed. Provider, Historical   aspirin 81 mg chewable tablet Take 1 Tab by mouth daily. 12/10/15   Concepcion Blackman, ALEKSANDER   atorvastatin (LIPITOR) 10 mg tablet Take  by mouth daily.     Provider, Historical         Review of Systems:  (bold if positive, if negative)    Gen:  Eyes:  ENT:  CVS:  Pulm:  GI:  Abdominal pain, nausea, eGU:  MS:  Skin:  Psych:  Endo:  Hem:  Renal:  Neuro:            Objective:      VITALS:    Vital signs reviewed; most recent are:    Visit Vitals  BP (!) 165/64   Pulse 86   Temp 98.2 °F (36.8 °C)   Resp 16   Ht 5' 5\" (1.651 m)   Wt 85.7 kg (189 lb)   SpO2 97%   BMI 31.45 kg/m²     SpO2 Readings from Last 6 Encounters:   09/16/20 97%   06/25/20 96%   05/13/20 98%   06/24/19 98%   06/08/18 97%   12/21/17 97%        No intake or output data in the 24 hours ending 09/16/20 1216         Exam:     Physical Exam:    Gen:  obese, in no acute distress  HEENT:  Pink conjunctivae, PERRL, hearing intact to voice, moist mucous membranes  Neck:  Supple, without masses, thyroid non-tender  Resp:  No accessory muscle use, clear breath sounds without wheezes rales or rhonchi  Card:  No murmurs, normal S1, S2 without thrills, bruits or peripheral edema  Abd:  Soft, non-tender, non-distended, normoactive bowel sounds are present, no palpable organomegaly and no detectable hernias  Lymph:  No cervical or inguinal adenopathy  Musc:  No cyanosis or clubbing  Skin:  No rashes or ulcers, skin turgor is good  Neuro:  Cranial nerves are grossly intact, no focal motor weakness, follows commands appropriately  Psych:  Good insight, oriented to person, place and time, alert       Labs:    Recent Labs     09/16/20  0905   WBC 24.1*   HGB 15.0   HCT 43.4        Recent Labs     09/16/20  0905   *   K 4.3      CO2 20*   *   BUN 15   CREA 1.05   CA 8.5   ALB 3.6   TBILI 1.2*   ALT 23     No results found for: GLUCPOC  No results for input(s): PH, PCO2, PO2, HCO3, FIO2 in the last 72 hours. No results for input(s): INR, INREXT in the last 72 hours. Telemetry reviewed:   normal sinus rhythm       Assessment/Plan:    1. Gallstone pancreatitis (9/16/2020). Admit to medical.  Keep n.p.o. Check MRCP. IV fluids, IV morphine for pain control. Consult GI. 2.  Cholelithiasis. Check MRCP and consult general surgery. The gallbladder may need to come out     3. CAD (coronary artery disease) (12/9/2015). Continue Coreg but hold aspirin for possible cholecystectomy    4. HTN (hypertension) (9/16/2020). Continue Coreg and lisinopril    5. [facility] (9/16/2020). Hold glipizide and cover with SSI for now     6. Leukocytosis. Likely secondary to #1. Monitor     7. Obesity/ELLYN. Would benefit from weight loss.  May use own CPAP    Code status: full      Previous medical records reviewed     Risk of deterioration: high      Total time spent with patient: [address] [place] [address] discussed with: Patient, Family, Nursing Staff and >50% of time spent in counseling and coordination of care    Discussed:  Care Plan    Prophylaxis:  Lovenox    Probable Disposition:  Home w/Family           ___________________________________________________    Attending Physician: Christella Sicard, MD

## 2020-09-16 NOTE — ED PROVIDER NOTES
HPI     Pt is a 79 y.o. M with PMH of HTN, HLD, CAD, aortic aneurysm here with c/o upper abdominal pain since yesterday. He has not noticed any exacerbating factors. He did take a tylenol and around 5 AM pain started to subside. He denies N/V/D but has had decreased appetite x 2 days. No chest pain or radiation of abdominal pain. No shortness of breath or cough. No other complaints at this time. Past Medical History:   Diagnosis Date    HTN (hypertension)     Hyperlipemia     Kidney stone        Past Surgical History:   Procedure Laterality Date    HX HERNIA REPAIR           Family History:   Problem Relation Age of Onset    Colon Cancer Mother     Heart Attack Father     Coronary Artery Disease Father     Other Father         Angina    Cancer Sister        Social History     Socioeconomic History    Marital status:      Spouse name: Not on file    Number of children: Not on file    Years of education: Not on file    Highest education level: Not on file   Occupational History    Not on file   Social Needs    Financial resource strain: Not on file    Food insecurity     Worry: Not on file     Inability: Not on file    Transportation needs     Medical: Not on file     Non-medical: Not on file   Tobacco Use    Smoking status: Former Smoker     Types: Pipe     Last attempt to quit: 2014     Years since quittin.7    Smokeless tobacco: Never Used   Substance and Sexual Activity    Alcohol use:  Yes     Alcohol/week: 0.0 standard drinks    Drug use: Not on file    Sexual activity: Not on file   Lifestyle    Physical activity     Days per week: Not on file     Minutes per session: Not on file    Stress: Not on file   Relationships    Social connections     Talks on phone: Not on file     Gets together: Not on file     Attends Ca[employer] service: Not on file     Active member of club or organization: Not on file     Attends meetings of clubs or organizations: Not on file Relationship status: Not on file    Intimate partner violence     Fear of current or ex partner: Not on file     Emotionally abused: Not on file     Physically abused: Not on file     Forced sexual activity: Not on file   Other Topics Concern    Not on file   Social History Narrative    Not on file         ALLERGIES: Sulfa (sulfonamide antibiotics)    Review of Systems   Constitutional: Positive for appetite change (decreased). Negative for chills, diaphoresis and fever. HENT: Negative for congestion and trouble swallowing. Eyes: Negative for photophobia and visual disturbance. Respiratory: Negative for cough, chest tightness and shortness of breath. Cardiovascular: Negative for chest pain, palpitations and leg swelling. Gastrointestinal: Positive for abdominal pain. Negative for diarrhea, nausea and vomiting. Genitourinary: Negative for difficulty urinating, dysuria, flank pain and frequency. Musculoskeletal: Negative for back pain and myalgias. Skin: Negative for rash and wound. Neurological: Negative for dizziness, weakness, light-headedness and headaches. Hematological: Negative for adenopathy. Does not bruise/bleed easily. Psychiatric/Behavioral: Negative for agitation and confusion. All other systems reviewed and are negative. Vitals:    09/16/20 0828   BP: (!) 183/88   Pulse: 86   Resp: 16   Temp: 98.2 °F (36.8 °C)   SpO2: 98%   Weight: 85.7 kg (189 lb)   Height: 5' 5\" (1.651 m)            Physical Exam  Vitals signs and nursing note reviewed. Constitutional:       General: He is not in acute distress. Appearance: He is well-developed. He is not diaphoretic. Comments: Hard of hearing   HENT:      Head: Normocephalic. Eyes:      Conjunctiva/sclera: Conjunctivae normal.      Pupils: Pupils are equal, round, and reactive to light. Neck:      Musculoskeletal: Normal range of motion and neck supple. Vascular: No JVD.    Cardiovascular:      Rate and Rhythm: Normal rate and regular rhythm. Heart sounds: Normal heart sounds. Pulmonary:      Effort: Pulmonary effort is normal.      Breath sounds: Normal breath sounds. Abdominal:      General: Bowel sounds are normal. There is distension. Palpations: Abdomen is soft. Tenderness: There is abdominal tenderness in the right upper quadrant and epigastric area. Hernia: A hernia is present. Hernia is present in the umbilical area. Musculoskeletal: Normal range of motion. General: No tenderness or deformity. Lymphadenopathy:      Cervical: No cervical adenopathy. Skin:     General: Skin is warm and dry. Capillary Refill: Capillary refill takes less than 2 seconds. Findings: No erythema or rash. Neurological:      Mental Status: He is alert and oriented to person, place, and time. Cranial Nerves: No cranial nerve deficit. Sensory: No sensory deficit. MDM       Procedures      ED EKG interpretation:  Rhythm: normal sinus rhythm; and regular . Rate (approx.): 72 LVH, incomplete RBBB. normal P and duration. QRS duration slightly prolonged at 108. No STEMI or ST depression. T inverted lead III. EKG documented by Emelia Sen MD, scribe, as interpreted by Priyanka Paiz MD, ED MD.    Barby Damon for Admission  11:16 AM    ED Room Number: ER10/10  Patient Name and age:  Pako Owen 79 y.o.  male  Working Diagnosis:   1. Acute gallstone pancreatitis        COVID-19 Suspicion:  no  Sepsis present:  no  Reassessment needed: no  Code Status:  Full Code  Readmission: no  Isolation Requirements:  no  Recommended Level of Care:  med/surg   Department:[facility] ED - [phone]  Other:  Pt with abdominal pain and decreased appetite with gallstones and pancreatitis. Lipase over 3000 and stone in neck of gallbladder. Leukocytosis with slight elevation of Tbili to 1.2 but other LFTs normal.  IVF and pain medication given.     11:29 AM  Dr. Jamaica Calvillo accepts pt and Dr. Radha Lombardi will admit.       Nile Han MD

## 2020-09-16 NOTE — PROGRESS NOTES
3:35 PM  CM reviewed EMR. Pt admitted for acute pancreatitis today from ER.     RUR 7%    Transition of care note:  1). GI consulted  2). Consult to General Surgery  3).  CM will follow for dc notes    Sarita Page

## 2020-09-16 NOTE — ED NOTES
TRANSFER - OUT REPORT:    Verbal report given to ESTUARDO Nuñez on Connor Jenkins  being transferred to [facility] [phone] for routine progression of care       Report consisted of patients Situation, Background, Assessment and   Recommendations(SBAR). Information from the following report(s) SBAR, ED Summary, [facility] and Recent Results was reviewed with the receiving nurse. Opportunity for questions and clarification was provided.

## 2020-09-16 NOTE — PROGRESS NOTES
Problem: Falls - Risk of  Goal: *Absence of Falls  Description: Document Loy Suazo Fall Risk and appropriate interventions in the flowsheet.   Outcome: Progressing Towards Goal  Note: Fall Risk Interventions:            Medication Interventions: Assess postural VS orthostatic hypotension, Patient to call before getting OOB, Teach patient to arise slowly    Elimination Interventions: Bed/chair exit alarm, Patient to call for help with toileting needs, Stay With Me (per policy), Toileting schedule/hourly rounds, Urinal in reach              Problem: Patient Education: Go to Patient Education Activity  Goal: Patient/Family Education  Outcome: Progressing Towards Goal     Problem: Pancreatitis  Goal: *Control of acute pain  Outcome: Progressing Towards Goal  Goal: *Absence of nausea/vomiting  Outcome: Progressing Towards Goal  Goal: *Optimize nutritional status  Outcome: Progressing Towards Goal  Goal: *Labs within defined limits  Outcome: Progressing Towards Goal     Problem: Patient Education: Go to Patient Education Activity  Goal: Patient/Family Education  Outcome: Progressing Towards Goal     Problem: Pain  Goal: *Control of Pain  Outcome: Progressing Towards Goal     Problem: Patient Education: Go to Patient Education Activity  Goal: Patient/Family Education  Outcome: Progressing Towards Goal

## 2020-09-17 ENCOUNTER — APPOINTMENT (OUTPATIENT)
Dept: NON INVASIVE DIAGNOSTICS | Age: 80
DRG: 418 | End: 2020-09-17
Attending: SPECIALIST
Payer: MEDICARE

## 2020-09-17 ENCOUNTER — APPOINTMENT (OUTPATIENT)
Dept: NUCLEAR MEDICINE | Age: 80
DRG: 418 | End: 2020-09-17
Attending: SPECIALIST
Payer: MEDICARE

## 2020-09-17 LAB
ALBUMIN SERPL-MCNC: 3.1 G/DL (ref 3.5–5)
ALBUMIN/GLOB SERPL: 0.9 {RATIO} (ref 1.1–2.2)
ALP SERPL-CCNC: 83 U/L (ref 45–117)
ALT SERPL-CCNC: 16 U/L (ref 12–78)
ANION GAP SERPL CALC-SCNC: 8 MMOL/L (ref 5–15)
AST SERPL-CCNC: 11 U/L (ref 15–37)
BILIRUB SERPL-MCNC: 1.4 MG/DL (ref 0.2–1)
BUN SERPL-MCNC: 14 MG/DL (ref 6–20)
BUN/CREAT SERPL: 16 (ref 12–20)
CALCIUM SERPL-MCNC: 8.4 MG/DL (ref 8.5–10.1)
CHLORIDE SERPL-SCNC: 105 MMOL/L (ref 97–108)
CO2 SERPL-SCNC: 21 MMOL/L (ref 21–32)
CREAT SERPL-MCNC: 0.88 MG/DL (ref 0.7–1.3)
ERYTHROCYTE [DISTWIDTH] IN BLOOD BY AUTOMATED COUNT: 12.5 % (ref 11.5–14.5)
GLOBULIN SER CALC-MCNC: 3.5 G/DL (ref 2–4)
GLUCOSE BLD STRIP.AUTO-MCNC: 163 MG/DL (ref 65–100)
GLUCOSE BLD STRIP.AUTO-MCNC: 170 MG/DL (ref 65–100)
GLUCOSE BLD STRIP.AUTO-MCNC: 181 MG/DL (ref 65–100)
GLUCOSE BLD STRIP.AUTO-MCNC: 188 MG/DL (ref 65–100)
GLUCOSE BLD STRIP.AUTO-MCNC: 190 MG/DL (ref 65–100)
GLUCOSE SERPL-MCNC: 185 MG/DL (ref 65–100)
HCT VFR BLD AUTO: 40.2 % (ref 36.6–50.3)
HGB BLD-MCNC: 13.7 G/DL (ref 12.1–17)
LIPASE SERPL-CCNC: 363 U/L (ref 73–393)
MCH RBC QN AUTO: 30.8 PG (ref 26–34)
MCHC RBC AUTO-ENTMCNC: 34.1 G/DL (ref 30–36.5)
MCV RBC AUTO: 90.3 FL (ref 80–99)
NRBC # BLD: 0 K/UL (ref 0–0.01)
NRBC BLD-RTO: 0 PER 100 WBC
PLATELET # BLD AUTO: 215 K/UL (ref 150–400)
PMV BLD AUTO: 10 FL (ref 8.9–12.9)
POTASSIUM SERPL-SCNC: 3.6 MMOL/L (ref 3.5–5.1)
PROT SERPL-MCNC: 6.6 G/DL (ref 6.4–8.2)
RBC # BLD AUTO: 4.45 M/UL (ref 4.1–5.7)
SERVICE CMNT-IMP: ABNORMAL
SODIUM SERPL-SCNC: 134 MMOL/L (ref 136–145)
STRESS BASELINE DIAS BP: 75 MMHG
STRESS BASELINE HR: 69 BPM
STRESS BASELINE SYS BP: 146 MMHG
STRESS ESTIMATED WORKLOAD: 1 METS
STRESS EXERCISE DUR MIN: NORMAL
STRESS PEAK DIAS BP: 75 MMHG
STRESS PEAK SYS BP: 146 MMHG
STRESS PERCENT HR ACHIEVED: 67 %
STRESS POST PEAK HR: 95 BPM
STRESS RATE PRESSURE PRODUCT: NORMAL BPM*MMHG
STRESS ST DEPRESSION: 0 MM
STRESS ST ELEVATION: 0 MM
STRESS TARGET HR: 141 BPM
TROPONIN I SERPL-MCNC: <0.05 NG/ML
WBC # BLD AUTO: 24.2 K/UL (ref 4.1–11.1)

## 2020-09-17 PROCEDURE — 93005 ELECTROCARDIOGRAM TRACING: CPT

## 2020-09-17 PROCEDURE — 74011250637 HC RX REV CODE- 250/637: Performed by: INTERNAL MEDICINE

## 2020-09-17 PROCEDURE — 82962 GLUCOSE BLOOD TEST: CPT

## 2020-09-17 PROCEDURE — 99222 1ST HOSP IP/OBS MODERATE 55: CPT | Performed by: SPECIALIST

## 2020-09-17 PROCEDURE — 84484 ASSAY OF TROPONIN QUANT: CPT

## 2020-09-17 PROCEDURE — 85027 COMPLETE CBC AUTOMATED: CPT

## 2020-09-17 PROCEDURE — 80053 COMPREHEN METABOLIC PANEL: CPT

## 2020-09-17 PROCEDURE — 65270000029 HC RM PRIVATE

## 2020-09-17 PROCEDURE — A9500 TC99M SESTAMIBI: HCPCS

## 2020-09-17 PROCEDURE — 36415 COLL VENOUS BLD VENIPUNCTURE: CPT

## 2020-09-17 PROCEDURE — 74011636637 HC RX REV CODE- 636/637: Performed by: INTERNAL MEDICINE

## 2020-09-17 PROCEDURE — 83690 ASSAY OF LIPASE: CPT

## 2020-09-17 PROCEDURE — 74011250636 HC RX REV CODE- 250/636: Performed by: INTERNAL MEDICINE

## 2020-09-17 PROCEDURE — 74011000258 HC RX REV CODE- 258: Performed by: SURGERY

## 2020-09-17 PROCEDURE — 74011250636 HC RX REV CODE- 250/636: Performed by: SURGERY

## 2020-09-17 PROCEDURE — 74011250636 HC RX REV CODE- 250/636: Performed by: SPECIALIST

## 2020-09-17 RX ORDER — INDOCYANINE GREEN AND WATER 25 MG
0.5 KIT INJECTION
Status: DISCONTINUED | OUTPATIENT
Start: 2020-09-18 | End: 2020-09-18

## 2020-09-17 RX ORDER — LISINOPRIL 5 MG/1
10 TABLET ORAL ONCE
Status: COMPLETED | OUTPATIENT
Start: 2020-09-17 | End: 2020-09-17

## 2020-09-17 RX ORDER — LISINOPRIL 5 MG/1
10 TABLET ORAL DAILY
Status: DISCONTINUED | OUTPATIENT
Start: 2020-09-17 | End: 2020-09-17

## 2020-09-17 RX ORDER — LISINOPRIL 20 MG/1
20 TABLET ORAL DAILY
Status: DISCONTINUED | OUTPATIENT
Start: 2020-09-18 | End: 2020-09-18

## 2020-09-17 RX ADMIN — MORPHINE SULFATE 2 MG: 2 INJECTION, SOLUTION INTRAMUSCULAR; INTRAVENOUS at 21:54

## 2020-09-17 RX ADMIN — Medication 10 ML: at 15:48

## 2020-09-17 RX ADMIN — REGADENOSON 0.4 MG: 0.08 INJECTION, SOLUTION INTRAVENOUS at 14:21

## 2020-09-17 RX ADMIN — INSULIN LISPRO 2 UNITS: 100 INJECTION, SOLUTION INTRAVENOUS; SUBCUTANEOUS at 12:17

## 2020-09-17 RX ADMIN — INSULIN LISPRO 2 UNITS: 100 INJECTION, SOLUTION INTRAVENOUS; SUBCUTANEOUS at 18:24

## 2020-09-17 RX ADMIN — LISINOPRIL 10 MG: 5 TABLET ORAL at 12:17

## 2020-09-17 RX ADMIN — LISINOPRIL 10 MG: 5 TABLET ORAL at 09:09

## 2020-09-17 RX ADMIN — Medication 10 ML: at 21:31

## 2020-09-17 RX ADMIN — INSULIN LISPRO 2 UNITS: 100 INJECTION, SOLUTION INTRAVENOUS; SUBCUTANEOUS at 06:39

## 2020-09-17 RX ADMIN — CARVEDILOL 12.5 MG: 12.5 TABLET, FILM COATED ORAL at 09:09

## 2020-09-17 RX ADMIN — Medication 10 ML: at 05:16

## 2020-09-17 RX ADMIN — ENOXAPARIN SODIUM 40 MG: 40 INJECTION SUBCUTANEOUS at 21:31

## 2020-09-17 RX ADMIN — PIPERACILLIN AND TAZOBACTAM 3.38 G: 3; .375 INJECTION, POWDER, LYOPHILIZED, FOR SOLUTION INTRAVENOUS at 09:09

## 2020-09-17 RX ADMIN — ACETAMINOPHEN 650 MG: 325 TABLET ORAL at 15:58

## 2020-09-17 RX ADMIN — SODIUM CHLORIDE, SODIUM LACTATE, POTASSIUM CHLORIDE, AND CALCIUM CHLORIDE 125 ML/HR: 600; 310; 30; 20 INJECTION, SOLUTION INTRAVENOUS at 09:10

## 2020-09-17 RX ADMIN — PIPERACILLIN AND TAZOBACTAM 3.38 G: 3; .375 INJECTION, POWDER, LYOPHILIZED, FOR SOLUTION INTRAVENOUS at 15:47

## 2020-09-17 RX ADMIN — PIPERACILLIN AND TAZOBACTAM 3.38 G: 3; .375 INJECTION, POWDER, LYOPHILIZED, FOR SOLUTION INTRAVENOUS at 23:56

## 2020-09-17 RX ADMIN — CARVEDILOL 12.5 MG: 12.5 TABLET, FILM COATED ORAL at 18:24

## 2020-09-17 NOTE — PROGRESS NOTES
Went to see patient but was getting cards test  Spoke with daughter in room  Remain NPO for possible OR tomorrow, depending on cardiology evaluation

## 2020-09-17 NOTE — PROGRESS NOTES
Bedside and Verbal shift change report given to Funmilayo (oncoming nurse) by [employer] (offgoing nurse). Report included the following information SBAR, Kardex and MAR.

## 2020-09-17 NOTE — PROGRESS NOTES
[address] NP  [phone]           GI PROGRESS NOTE        NAME: Sharyn Palma   :  1940   MRN:  [id number]       Subjective:   \"Wake me up when its over. \"  No complaints this afternoon. Objective:   NAD    VITALS:   Last 24hrs VS reviewed since prior progress note. Most recent are:  Visit Vitals  BP (!) 146/75   Pulse 66   Temp 97.7 °F (36.5 °C)   Resp 18   Ht 5' 5\" (1.651 m)   Wt 85.7 kg (189 lb)   SpO2 95%   BMI 31.45 kg/m²       Intake/Output Summary (Last 24 hours) at 2020 1601  Last data filed at 2020 1022  Gross per 24 hour   Intake 547.92 ml   Output 1605 ml   Net -1057.08 ml       PHYSICAL EXAM:  General: Alert, in no acute distress    HEENT: Anicteric sclerae. Lungs:            CTA Bilaterally. Heart:  Regular  rhythm,    Abdomen: Soft, not distended,  Tender to palpation in upper abdomen.  (+)Bowel sounds, no HSM  Extremities: No c/c/e  Neurologic:  CN 2-12 gi, Alert and oriented X 3. No acute neurological distress   Psych:   Good insight. Not anxious nor agitated.     Lab Data Reviewed:   Recent Labs     20  0350 20  0905   WBC 24.2* 24.1*   HGB 13.7 15.0   HCT 40.2 43.4    247     Recent Labs     20  0350 20  0905   * 132*   K 3.6 4.3    103   CO2 21 20*   BUN 14 15   CREA 0.88 1.05   * 284*   CA 8.4* 8.5     Recent Labs     20  0350 20  0905   AP 83 99   TP 6.6 7.6   ALB 3.1* 3.6   GLOB 3.5 4.0   LPSE 363 >3,000*       ________________________________________________________________________  Patient Active Problem List   Diagnosis Code    CAD (coronary artery disease) I25.10    Chest pain R07.9    SOB (shortness of breath) R06.02    Acute pancreatitis K85.90    HTN (hypertension) I10    DM (diabetes mellitus) (HCC) E11.9    Pancreatitis K85.90         Assessment and Plan:  Acute Gallstone Pancreatitis - MRCP showing 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       Following       Signed By: Kathy Alan NP     9/17/2020  4:01 PM

## 2020-09-17 NOTE — PROGRESS NOTES
William Mayes [facility] 79  380 [facility], [address] [place] [address]  [phone]      Medical Progress Note      NAME: Ariana Tam   :  1940  MRM:  [id number]    Date of service: 2020  10:05 AM       Assessment and Plan:   1. Gallstone pancreatitis (2020). Keep n.p.o. Check MRCP result. cont IV fluids, IV morphine for pain control. lipase normalized. GI evaluation appreciated.     2. Cholelithiasis. Check result of MRCP. Evaluated by surgery and planned for cholecystectomy. on ABx per general surgery      3. Chest pressure. Hx of CAD (coronary artery disease) (2015). check troponin, EKG and consult cardiology. Continue Coreg but hold aspirin for possible cholecystectomy     4. HTN (hypertension) (2020). Continue Coreg and lisinopril     5. DM (diabetes mellitus) ([facility] (2020). Hold glipizide and cover with SSI for now      6.  Leukocytosis. Likely secondary to #1. Monitor      7. Obesity/ELLYN. Would benefit from weight loss. May use own CPAP     Code status: full          Subjective:     Chief Complaint[de-identified] Patient was seen and examined as a follow up for pancreatitis. Chart was reviewed. c/o chest pressure     ROS:  (bold if positive, if negative)    Tolerating PT  Tolerating Diet        Objective:     Last 24hrs VS reviewed since prior progress note.  Most recent are:    Visit Vitals  BP (!) 175/84 (BP 1 Location: Right arm, BP Patient Position: At rest)   Pulse 68   Temp 97.9 °F (36.6 °C)   Resp 18   Ht 5' 5\" (1.651 m)   Wt 85.7 kg (189 lb)   SpO2 95%   BMI 31.45 kg/m²     SpO2 Readings from Last 6 Encounters:   20 95%   20 96%   20 98%   19 98%   18 97%   17 97%            Intake/Output Summary (Last 24 hours) at 2020 1005  Last data filed at 2020 0615  Gross per 24 hour   Intake 850 ml   Output 1380 ml   Net -530 ml        Physical Exam:    Gen:  Well-developed, well-nourished, in no acute distress  HEENT:  Pink conjunctivae, PERRL, hearing intact to voice, moist mucous membranes  Neck:  Supple, without masses, thyroid non-tender  Resp:  No accessory muscle use, clear breath sounds without wheezes rales or rhonchi  Card:  No murmurs, normal S1, S2 without thrills, bruits or peripheral edema  Abd:  Soft, non-tender, non-distended, normoactive bowel sounds are present, no palpable organomegaly and no detectable hernias  Lymph:  No cervical or inguinal adenopathy  Musc:  No cyanosis or clubbing  Skin:  No rashes or ulcers, skin turgor is good  Neuro:  Cranial nerves are grossly intact, no focal motor weakness, follows commands appropriately  Psych:  Good insight, oriented to person, place and time, alert  __________________________________________________________________  Medications Reviewed: (see below)  Medications:     Current Facility-Administered Medications   Medication Dose Route Frequency    carvediloL (COREG) tablet 12.5 mg  12.5 mg Oral BID WITH MEALS    lisinopriL (PRINIVIL, ZESTRIL) tablet 10 mg  10 mg Oral DAILY    sodium chloride (NS) flush 5-40 mL  5-40 mL IntraVENous Q8H    sodium chloride (NS) flush 5-40 mL  5-40 mL IntraVENous PRN    enoxaparin (LOVENOX) injection 40 mg  40 mg SubCUTAneous Q24H    lactated Ringers infusion  125 mL/hr IntraVENous CONTINUOUS    morphine injection 2 mg  2 mg IntraVENous Q4H PRN    ondansetron (ZOFRAN) injection 4 mg  4 mg IntraVENous Q6H PRN    insulin lispro (HUMALOG) injection   SubCUTAneous Q6H    glucose chewable tablet 16 g  4 Tab Oral PRN    dextrose (D50W) injection syrg 12.5-25 g  12.5-25 g IntraVENous PRN    glucagon (GLUCAGEN) injection 1 mg  1 mg IntraMUSCular PRN    hydrALAZINE (APRESOLINE) 20 mg/mL injection 10 mg  10 mg IntraVENous Q6H PRN    piperacillin-tazobactam (ZOSYN) 3.375 g in 0.9% sodium chloride (MBP/ADV) 100 mL  3.375 g IntraVENous Q8H    fluticasone propionate (FLONASE) 50 mcg/actuation nasal spray 2 Spray  2 Spray Both Nostrils DAILY PRN    acetaminophen (TYLENOL) tablet 650 mg  650 mg Oral Q6H PRN        Lab Data Reviewed: (see below)  Lab Review:     Recent Labs     09/17/20  0350 09/16/20  0905   WBC 24.2* 24.1*   HGB 13.7 15.0   HCT 40.2 43.4    247     Recent Labs     09/17/20  0350 09/16/20  0905   * 132*   K 3.6 4.3    103   CO2 21 20*   * 284*   BUN 14 15   CREA 0.88 1.05   CA 8.4* 8.5   ALB 3.1* 3.6   TBILI 1.4* 1.2*   ALT 16 23     Lab Results   Component Value Date/Time    Glucose (POC) 188 (H) 09/17/2020 06:12 AM    Glucose (POC) 170 (H) 09/17/2020 12:04 AM    Glucose (POC) 157 (H) 09/16/2020 11:41 PM    Glucose (POC) 190 (H) 09/16/2020 05:58 PM    Glucose (POC) 216 (H) 09/16/2020 01:39 PM     No results for input(s): PH, PCO2, PO2, HCO3, FIO2 in the last 72 hours. No results for input(s): INR, INREXT in the last 72 hours. All Micro Results     None          I have reviewed notes of prior 24hr. Other pertinent lab:      Total time spent with patient: Fern[place] 59 discussed with: Patient, Nursing Staff and >50% of time spent in counseling and coordination of care    Discussed:  Care Plan    Prophylaxis:  Lovenox    Disposition:  Home w/Family           ___________________________________________________    Attending Physician: Thaddeus King MD

## 2020-09-17 NOTE — PROGRESS NOTES
Bedside and Verbal shift change report given to ESTUARDO Esparza (oncoming nurse) by Ivelisse CARRINGTON (offgoing nurse). Report included the following information SBAR, Kardex, Intake/Output and Recent Results.

## 2020-09-17 NOTE — PROGRESS NOTES
Transition of Care Plan:      RUR Score 9% low  Chart Reviewed -   Gallstone pancreatitis (9/16/2020). 1. Pt is NPO, cont IV fluids, IV morphine for pain control. lipase normalized. 2. GI is following. 3. Patient lives alone, daughter will transport. Bunnyudaytyler Jaya [phone]. 4. CM will continue to follow and assist with discharge planning.    BARBARA Perry

## 2020-09-17 NOTE — CONSULTS
CARDIOLOGY CONSULTATION NOTE    Heriberto Brice MD,  [address], [place], [address] [facility]  Phone [phone]; Fax [phone]  Mobile [phone]   Voice Mail [phone]                               2020  8:29 AM  Jean-Paul Jacques MD  :  1940   MRN:  [id number]     CC: chest pressure  Reason for consult: pre-op clearance  Admission Diagnosis: Acute pancreatitis [K85.90]; Pancreatitis [K85.90]       ATTENTION:   This medical record was transcribed using an electronic medical records/speech recognition system. Although proofread, it may and can contain electronic, spelling and other errors. Corrections may be executed at a later time. Please feel free to contact us for any clarifications as needed. Impression Plan/Recommendation   1. Cardiac preoperative or stratification for noncardiac surgery  2. CAD status post drug-eluting stent to the LAD  3. History of moderate aortic regurgitation  4. Hypertension  5. Ascending aortic aneurysm measuring 41 mm  6. Sleep apnea on CPAP  7. Dyslipidemia  8. Chest pain               · Intermediate risk for this surgery with nml EF on last echo  · Trend troponin  · Continue Coreg and Lisinopril   · IV Hydralazine PRN  ·             EKG in my office today demonstrated sinus bradycardia with heart rate of 59 bpm with normal axis with normal intervals but with normal ST segment. Lizzie Vila is a 79 y.o. male I am seeing for cardiac preoperative risk stratification. He is followed by Dr. George Garibay and has a history of hypertension dyslipidemia, CAD, aortic aneurysm. He has had 2 drug-eluting stents in the past on the day of admission he was complaining of upper abdominal discomfort. Did not have any nausea or vomiting but had a decreased appetite for 2 days.   Ultrasound of the abdomen demonstrates cholelithiasis without evidence of gallbladder wall thickening, and an MRI of the abdomen shows that the pancreas is large and inflamed. His white count is elevated 24,000 with a potassium 3.6 creatinine 0.88 and a lipase of admission was greater than 3000. Lipase now 363. His EKG on 2020 demonstrated normal sinus rhythm with incomplete right bundle branch block. He reports pressure in the center of his chest that started this morning. Describes as a constant pressure occurring at rest.  Rated 4/10. He has been in bed and is unsure if he has symptoms on exertion. Denies SOB, n/v, radiating symptoms. He is mostly sedentary at home but is able to perform ADLs independently. Denies dyspnea or chest pain on exertion at baseline. Cardiac risk factors: family history, dyslipidemia, diabetes mellitus, obesity, sedentary life style, male gender, hypertension. Allergies   Allergen Reactions    Sulfa (Sulfonamide Antibiotics) Unknown (comments)         Past Medical History:   Diagnosis Date    HTN (hypertension)     Hyperlipemia     Kidney stone         Past Surgical History:   Procedure Laterality Date    HX HERNIA REPAIR          . Home Medications:  Prior to Admission Medications   Prescriptions Last Dose Informant Patient Reported? Taking?   acetaminophen (TYLENOL) 500 mg tablet  Self Yes Yes   Sig: Take 500 mg by mouth every six (6) hours as needed for Pain. aspirin 81 mg chewable tablet 9/15/2020 at Unknown time Self Yes Yes   Sig: Take 1 Tab by mouth daily. atorvastatin (LIPITOR) 10 mg tablet 9/15/2020 at Unknown time Self Yes Yes   Sig: Take 10 mg by mouth nightly. carvediloL (COREG) 12.5 mg tablet 2020 at Unknown time Self No Yes   Sig: TAKE 1 TABLET BY MOUTH TWICE A DAY   colchicine 0.6 mg tablet  at Unknown time Self Yes Yes   Sig: Take 0.6 mg by mouth as needed for Gout. fluticasone (FLONASE) 50 mcg/actuation nasal spray 2020 at Unknown time Self Yes Yes   Si Sprays by Both Nostrils route as needed for Rhinitis.    glipiZIDE SR (GLUCOTROL XL) 2.5 mg CR tablet 2020 at Unknown time Self Yes Yes   Sig: Take 2.5 mg by mouth two (2) times daily (with meals). lisinopril (PRINIVIL, ZESTRIL) 10 mg tablet 9/15/2020 at Unknown time Self No Yes   Sig: TAKE 1 TABLET BY MOUTH EVERY DAY      Facility-Administered Medications: None       Hospital Medications:  Current Facility-Administered Medications   Medication Dose Route Frequency    carvediloL (COREG) tablet 12.5 mg  12.5 mg Oral BID WITH MEALS    lisinopriL (PRINIVIL, ZESTRIL) tablet 10 mg  10 mg Oral DAILY    sodium chloride (NS) flush 5-40 mL  5-40 mL IntraVENous Q8H    sodium chloride (NS) flush 5-40 mL  5-40 mL IntraVENous PRN    enoxaparin (LOVENOX) injection 40 mg  40 mg SubCUTAneous Q24H    lactated Ringers infusion  125 mL/hr IntraVENous CONTINUOUS    morphine injection 2 mg  2 mg IntraVENous Q4H PRN    ondansetron (ZOFRAN) injection 4 mg  4 mg IntraVENous Q6H PRN    insulin lispro (HUMALOG) injection   SubCUTAneous Q6H    glucose chewable tablet 16 g  4 Tab Oral PRN    dextrose (D50W) injection syrg 12.5-25 g  12.5-25 g IntraVENous PRN    glucagon (GLUCAGEN) injection 1 mg  1 mg IntraMUSCular PRN    hydrALAZINE (APRESOLINE) 20 mg/mL injection 10 mg  10 mg IntraVENous Q6H PRN    piperacillin-tazobactam (ZOSYN) 3.375 g in 0.9% sodium chloride (MBP/ADV) 100 mL  3.375 g IntraVENous Q8H    fluticasone propionate (FLONASE) 50 mcg/actuation nasal spray 2 Spray  2 Spray Both Nostrils DAILY PRN    acetaminophen (TYLENOL) tablet 650 mg  650 mg Oral Q6H PRN          OBJECTIVE       Laboratory and Imaging have been reviewed and are notable for      ECG on 9/16: NSR w/ incomplete RBBB. QTc 464. ECG on 9/17: NSR w/ incomplete RBBB. Qtc 444, unchanged from previous. Diagnostic Tests:     No results for input(s): CPK, CKMB, TROIQ in the last 72 hours.     No lab exists for component: CKQMB, CPKMB  Recent Labs     09/17/20  0350 09/16/20  0905   * 132*   K 3.6 4.3   CO2 21 20*   BUN 14 15   CREA 0.88 1.05   * 284* WBC 24.2* 24.1*   HGB 13.7 15.0   HCT 40.2 43.4    247         Cardiac work up to date:  6/24/19: Echo-LVEF 50%, hypokinesis of basal inferior, basal inferolateral, mid inferior, and mid inferolateral segments. Mild G1DD. Aortic valve sclerosis w/ mod AR, mild OH, mild MR.  1/9/17: ECHO- LVEF 55%, mild hypokinesis of the basal-mid inferolateral wall(s), G1DD; Mild MR; AV leaflets exhibited sclerosis, mod AR; mild TR; mild OH    12/28/16: LEXISCAN- Normal Study LVEF 87%, no ischemic ST changes  CMRI 12/2/15    12/9/2015: CATH  --- S/p TRELL ( 2.25 x 23, 2.25 x 12 )-> mLAD, PTCA mLCX W/ residual 80-90% but improved flow in distal vess  11/18/15: STRESS NUC- EF 55%, inferolateral mod partially reversible defect w/ mid-mod carole-infarct ischemia   El  12/2/15: CARDIAC MRI- 1. Normal left ventricular cavity size with preserved left ventricular   systolic function. Moderate hypokinesis of the base to mid and distal   lateral wall. Thinning of the basal lateral wall. 3-D LVEF 62%. 2. Normal right ventricular size and systolic function. RVEF 55%. 3. Mild 1+ aortic regurgitation. 4. 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 There is only a thin   rim of viable myocardium subtending this infarct. The entire anterior wall,   anteroseptal wall, anteroapical wall, apex, inferior wall, inferoseptal wall   and inferolateral wall does not demonstrate any infarct and are completely   viable. Based on the viability study the LCx territory does not demonstrate   any significant viability and has a large infarct and will not recover upon   revascularization. The LAD territory and RCA territories demonstrate   significant viability and does not demonstrate any infarct. 5. Normal pleura and pericardium. There is no significant effusions. 6. Mildly dilated ascending aorta measuring 40 x 30 mm.   7. Possibly enlarged thyroid with possible thyroid nodule. Social History:  Social History     Tobacco Use    Smoking status: Former Smoker     Types: Pipe     Last attempt to quit: 2014     Years since quittin.7    Smokeless tobacco: Never Used   Substance Use Topics    Alcohol use: Yes     Alcohol/week: 0.0 standard drinks       Family History:  Family History   Problem Relation Age of Onset    Colon Cancer Mother     Heart Attack Father     Coronary Artery Disease Father     Other Father         Angina    Cancer Sister        Review of Symptoms:  A comprehensive review of systems was negative except for that written in the HPI. Physical Exam:      Visit Vitals  BP (!) 175/84 (BP 1 Location: Right arm, BP Patient Position: At rest)   Pulse 68   Temp 97.9 °F (36.6 °C)   Resp 18   Ht 5' 5\" (1.651 m)   Wt 189 lb (85.7 kg)   SpO2 95%   BMI 31.45 kg/m²     General Appearance:  Well developed, well nourished,alert and oriented x 3, and individual in no acute distress. Ears/Nose/Mouth/Throat:   Hearing grossly normal.Normal oral mucosa,no scleral icterus     Neck: Supple no JVD or bruits,no cervical lymphadenopathy   Chest:   Lungs clear to auscultation bilaterally,no rales rhonchi or wheezing   Cardiovascular:  Regular rate and rhythm, S1, S2 normal,  2/6 systolic ejection murmur. PMI nondisplaced   Abdomen:   Soft, non-tender, bowel sounds are active. No abdominal bruits   Extremities: No edema bilaterally. Pulses detected, no varicosities   Skin: Warm and dry. No bruising  Neuro  Moves all extermities and neurologically intact                                                       I have discussed the diagnosis with the patient and the intended plan as seen in the above orders. Questions were answered concerning future plans. I have discussed medication side effects and warnings with the patient as well. Tony Hazel is in agreement to the plan listed above and wishes to proceed.      he  was instructed not to smoke, eat heart healthy diet  and to exercise. Thank you for this consult.       Cherylene Sakai, MD

## 2020-09-18 ENCOUNTER — ANESTHESIA (OUTPATIENT)
Dept: SURGERY | Age: 80
DRG: 418 | End: 2020-09-18
Payer: MEDICARE

## 2020-09-18 ENCOUNTER — ANESTHESIA EVENT (OUTPATIENT)
Dept: SURGERY | Age: 80
DRG: 418 | End: 2020-09-18
Payer: MEDICARE

## 2020-09-18 LAB
ALBUMIN SERPL-MCNC: 2.8 G/DL (ref 3.5–5)
ALBUMIN/GLOB SERPL: 0.8 {RATIO} (ref 1.1–2.2)
ALP SERPL-CCNC: 75 U/L (ref 45–117)
ALT SERPL-CCNC: 13 U/L (ref 12–78)
ANION GAP SERPL CALC-SCNC: 9 MMOL/L (ref 5–15)
AST SERPL-CCNC: 10 U/L (ref 15–37)
ATRIAL RATE: 66 BPM
ATRIAL RATE: 70 BPM
BASOPHILS # BLD: 0 K/UL (ref 0–0.1)
BASOPHILS NFR BLD: 0 % (ref 0–1)
BILIRUB SERPL-MCNC: 1.1 MG/DL (ref 0.2–1)
BUN SERPL-MCNC: 14 MG/DL (ref 6–20)
BUN/CREAT SERPL: 19 (ref 12–20)
CALCIUM SERPL-MCNC: 8.3 MG/DL (ref 8.5–10.1)
CALCULATED P AXIS, ECG09: 1 DEGREES
CALCULATED P AXIS, ECG09: 37 DEGREES
CALCULATED R AXIS, ECG10: -17 DEGREES
CALCULATED R AXIS, ECG10: 6 DEGREES
CALCULATED T AXIS, ECG11: 29 DEGREES
CALCULATED T AXIS, ECG11: 6 DEGREES
CHLORIDE SERPL-SCNC: 103 MMOL/L (ref 97–108)
CO2 SERPL-SCNC: 22 MMOL/L (ref 21–32)
CREAT SERPL-MCNC: 0.74 MG/DL (ref 0.7–1.3)
DIAGNOSIS, 93000: NORMAL
DIAGNOSIS, 93000: NORMAL
DIFFERENTIAL METHOD BLD: ABNORMAL
EOSINOPHIL # BLD: 0 K/UL (ref 0–0.4)
EOSINOPHIL NFR BLD: 0 % (ref 0–7)
ERYTHROCYTE [DISTWIDTH] IN BLOOD BY AUTOMATED COUNT: 12.5 % (ref 11.5–14.5)
GLOBULIN SER CALC-MCNC: 3.7 G/DL (ref 2–4)
GLUCOSE BLD STRIP.AUTO-MCNC: 144 MG/DL (ref 65–100)
GLUCOSE BLD STRIP.AUTO-MCNC: 149 MG/DL (ref 65–100)
GLUCOSE BLD STRIP.AUTO-MCNC: 159 MG/DL (ref 65–100)
GLUCOSE BLD STRIP.AUTO-MCNC: 159 MG/DL (ref 65–100)
GLUCOSE BLD STRIP.AUTO-MCNC: 263 MG/DL (ref 65–100)
GLUCOSE SERPL-MCNC: 143 MG/DL (ref 65–100)
HCT VFR BLD AUTO: 36.6 % (ref 36.6–50.3)
HGB BLD-MCNC: 12.7 G/DL (ref 12.1–17)
IMM GRANULOCYTES # BLD AUTO: 0.3 K/UL (ref 0–0.04)
IMM GRANULOCYTES NFR BLD AUTO: 1 % (ref 0–0.5)
LIPASE SERPL-CCNC: 60 U/L (ref 73–393)
LYMPHOCYTES # BLD: 1 K/UL (ref 0.8–3.5)
LYMPHOCYTES NFR BLD: 5 % (ref 12–49)
MAGNESIUM SERPL-MCNC: 2.2 MG/DL (ref 1.6–2.4)
MCH RBC QN AUTO: 30.8 PG (ref 26–34)
MCHC RBC AUTO-ENTMCNC: 34.7 G/DL (ref 30–36.5)
MCV RBC AUTO: 88.6 FL (ref 80–99)
MONOCYTES # BLD: 1.5 K/UL (ref 0–1)
MONOCYTES NFR BLD: 7 % (ref 5–13)
NEUTS SEG # BLD: 19.7 K/UL (ref 1.8–8)
NEUTS SEG NFR BLD: 87 % (ref 32–75)
NRBC # BLD: 0 K/UL (ref 0–0.01)
NRBC BLD-RTO: 0 PER 100 WBC
P-R INTERVAL, ECG05: 160 MS
P-R INTERVAL, ECG05: 162 MS
PHOSPHATE SERPL-MCNC: 2.3 MG/DL (ref 2.6–4.7)
PLATELET # BLD AUTO: 198 K/UL (ref 150–400)
PMV BLD AUTO: 9.9 FL (ref 8.9–12.9)
POTASSIUM SERPL-SCNC: 3.5 MMOL/L (ref 3.5–5.1)
PROT SERPL-MCNC: 6.5 G/DL (ref 6.4–8.2)
Q-T INTERVAL, ECG07: 408 MS
Q-T INTERVAL, ECG07: 416 MS
QRS DURATION, ECG06: 108 MS
QRS DURATION, ECG06: 110 MS
QTC CALCULATION (BEZET), ECG08: 427 MS
QTC CALCULATION (BEZET), ECG08: 449 MS
RBC # BLD AUTO: 4.13 M/UL (ref 4.1–5.7)
SERVICE CMNT-IMP: ABNORMAL
SODIUM SERPL-SCNC: 134 MMOL/L (ref 136–145)
VENTRICULAR RATE, ECG03: 66 BPM
VENTRICULAR RATE, ECG03: 70 BPM
WBC # BLD AUTO: 22.5 K/UL (ref 4.1–11.1)

## 2020-09-18 PROCEDURE — 2709999900 HC NON-CHARGEABLE SUPPLY: Performed by: SURGERY

## 2020-09-18 PROCEDURE — 74011250636 HC RX REV CODE- 250/636: Performed by: INTERNAL MEDICINE

## 2020-09-18 PROCEDURE — 74011250636 HC RX REV CODE- 250/636: Performed by: SURGERY

## 2020-09-18 PROCEDURE — 84100 ASSAY OF PHOSPHORUS: CPT

## 2020-09-18 PROCEDURE — 77030010507 HC ADH SKN DERMBND J&J -B: Performed by: SURGERY

## 2020-09-18 PROCEDURE — 77030033188 HC TBNG FLTRD BIIFUR DISP CNMD -C: Performed by: SURGERY

## 2020-09-18 PROCEDURE — 77030008756 HC TU IRR SUC STRY -B: Performed by: SURGERY

## 2020-09-18 PROCEDURE — 74011000258 HC RX REV CODE- 258: Performed by: SURGERY

## 2020-09-18 PROCEDURE — 77030016151 HC PROTCTR LNS DFOG COVD -B: Performed by: SURGERY

## 2020-09-18 PROCEDURE — 76060000033 HC ANESTHESIA 1 TO 1.5 HR: Performed by: SURGERY

## 2020-09-18 PROCEDURE — 74011636637 HC RX REV CODE- 636/637: Performed by: INTERNAL MEDICINE

## 2020-09-18 PROCEDURE — 77030035277 HC OBTRTR BLDELSS DISP INTU -B: Performed by: SURGERY

## 2020-09-18 PROCEDURE — 65270000029 HC RM PRIVATE

## 2020-09-18 PROCEDURE — 77030020263 HC SOL INJ SOD CL0.9% LFCR 1000ML: Performed by: SURGERY

## 2020-09-18 PROCEDURE — 76210000006 HC OR PH I REC 0.5 TO 1 HR: Performed by: SURGERY

## 2020-09-18 PROCEDURE — 77030018836 HC SOL IRR NACL ICUM -A: Performed by: SURGERY

## 2020-09-18 PROCEDURE — 83735 ASSAY OF MAGNESIUM: CPT

## 2020-09-18 PROCEDURE — 76010000934 HC OR TIME 1 TO 1.5HR INTENSV - TIER 2: Performed by: SURGERY

## 2020-09-18 PROCEDURE — 77030012770 HC TRCR OPT FX AMR -B: Performed by: SURGERY

## 2020-09-18 PROCEDURE — 77030013079 HC BLNKT BAIR HGGR 3M -A: Performed by: ANESTHESIOLOGY

## 2020-09-18 PROCEDURE — 8E0W4CZ ROBOTIC ASSISTED PROCEDURE OF TRUNK REGION, PERCUTANEOUS ENDOSCOPIC APPROACH: ICD-10-PCS | Performed by: SURGERY

## 2020-09-18 PROCEDURE — 82962 GLUCOSE BLOOD TEST: CPT

## 2020-09-18 PROCEDURE — 77030040361 HC SLV COMPR DVT MDII -B

## 2020-09-18 PROCEDURE — 74011000250 HC RX REV CODE- 250: Performed by: SURGERY

## 2020-09-18 PROCEDURE — 74011000250 HC RX REV CODE- 250: Performed by: NURSE ANESTHETIST, CERTIFIED REGISTERED

## 2020-09-18 PROCEDURE — 83690 ASSAY OF LIPASE: CPT

## 2020-09-18 PROCEDURE — 77030008684 HC TU ET CUF COVD -B: Performed by: ANESTHESIOLOGY

## 2020-09-18 PROCEDURE — 85025 COMPLETE CBC W/AUTO DIFF WBC: CPT

## 2020-09-18 PROCEDURE — 77030035029 HC NDL INSUF VERES DISP COVD -B: Performed by: SURGERY

## 2020-09-18 PROCEDURE — 74011250636 HC RX REV CODE- 250/636: Performed by: NURSE ANESTHETIST, CERTIFIED REGISTERED

## 2020-09-18 PROCEDURE — 77030031139 HC SUT VCRL2 J&J -A: Performed by: SURGERY

## 2020-09-18 PROCEDURE — 77030010939 HC CLP LIG TELE -B: Performed by: SURGERY

## 2020-09-18 PROCEDURE — 77030040926 HC LAP BG TISS RETVR CNMD -B: Performed by: SURGERY

## 2020-09-18 PROCEDURE — 36415 COLL VENOUS BLD VENIPUNCTURE: CPT

## 2020-09-18 PROCEDURE — 74011000254 HC RX REV CODE- 254: Performed by: ANESTHESIOLOGY

## 2020-09-18 PROCEDURE — 80053 COMPREHEN METABOLIC PANEL: CPT

## 2020-09-18 PROCEDURE — 88304 TISSUE EXAM BY PATHOLOGIST: CPT

## 2020-09-18 PROCEDURE — 74011250637 HC RX REV CODE- 250/637: Performed by: INTERNAL MEDICINE

## 2020-09-18 PROCEDURE — 0FT44ZZ RESECTION OF GALLBLADDER, PERCUTANEOUS ENDOSCOPIC APPROACH: ICD-10-PCS | Performed by: SURGERY

## 2020-09-18 PROCEDURE — 77030020703 HC SEAL CANN DISP INTU -B: Performed by: SURGERY

## 2020-09-18 PROCEDURE — 86301 IMMUNOASSAY TUMOR CA 19-9: CPT

## 2020-09-18 PROCEDURE — 77030040922 HC BLNKT HYPOTHRM STRY -A

## 2020-09-18 PROCEDURE — 77030026438 HC STYL ET INTUB CARD -A: Performed by: ANESTHESIOLOGY

## 2020-09-18 PROCEDURE — 77030008606 HC TRCR ENDOSC KII AMR -B: Performed by: SURGERY

## 2020-09-18 RX ORDER — NALOXONE HYDROCHLORIDE 0.4 MG/ML
0.2 INJECTION, SOLUTION INTRAMUSCULAR; INTRAVENOUS; SUBCUTANEOUS
Status: CANCELLED | OUTPATIENT
Start: 2020-09-18

## 2020-09-18 RX ORDER — INDOCYANINE GREEN AND WATER 25 MG
0.5 KIT INJECTION
Status: DISPENSED | OUTPATIENT
Start: 2020-09-18 | End: 2020-09-19

## 2020-09-18 RX ORDER — SODIUM CHLORIDE, SODIUM LACTATE, POTASSIUM CHLORIDE, CALCIUM CHLORIDE 600; 310; 30; 20 MG/100ML; MG/100ML; MG/100ML; MG/100ML
125 INJECTION, SOLUTION INTRAVENOUS CONTINUOUS
Status: DISCONTINUED | OUTPATIENT
Start: 2020-09-18 | End: 2020-09-18 | Stop reason: HOSPADM

## 2020-09-18 RX ORDER — GLYCOPYRROLATE 0.2 MG/ML
INJECTION INTRAMUSCULAR; INTRAVENOUS AS NEEDED
Status: DISCONTINUED | OUTPATIENT
Start: 2020-09-18 | End: 2020-09-18 | Stop reason: HOSPADM

## 2020-09-18 RX ORDER — KETOROLAC TROMETHAMINE 30 MG/ML
INJECTION, SOLUTION INTRAMUSCULAR; INTRAVENOUS AS NEEDED
Status: DISCONTINUED | OUTPATIENT
Start: 2020-09-18 | End: 2020-09-18 | Stop reason: HOSPADM

## 2020-09-18 RX ORDER — INDOCYANINE GREEN AND WATER 25 MG
KIT INJECTION AS NEEDED
Status: DISCONTINUED | OUTPATIENT
Start: 2020-09-18 | End: 2020-09-18 | Stop reason: HOSPADM

## 2020-09-18 RX ORDER — LISINOPRIL 20 MG/1
40 TABLET ORAL DAILY
Status: DISCONTINUED | OUTPATIENT
Start: 2020-09-18 | End: 2020-09-19 | Stop reason: HOSPADM

## 2020-09-18 RX ORDER — EPHEDRINE SULFATE/0.9% NACL/PF 50 MG/5 ML
SYRINGE (ML) INTRAVENOUS AS NEEDED
Status: DISCONTINUED | OUTPATIENT
Start: 2020-09-18 | End: 2020-09-18 | Stop reason: HOSPADM

## 2020-09-18 RX ORDER — PROPOFOL 10 MG/ML
INJECTION, EMULSION INTRAVENOUS AS NEEDED
Status: DISCONTINUED | OUTPATIENT
Start: 2020-09-18 | End: 2020-09-18 | Stop reason: HOSPADM

## 2020-09-18 RX ORDER — SODIUM CHLORIDE, SODIUM LACTATE, POTASSIUM CHLORIDE, CALCIUM CHLORIDE 600; 310; 30; 20 MG/100ML; MG/100ML; MG/100ML; MG/100ML
125 INJECTION, SOLUTION INTRAVENOUS CONTINUOUS
Status: CANCELLED | OUTPATIENT
Start: 2020-09-18 | End: 2020-09-19

## 2020-09-18 RX ORDER — FLUMAZENIL 0.1 MG/ML
0.2 INJECTION INTRAVENOUS
Status: CANCELLED | OUTPATIENT
Start: 2020-09-18

## 2020-09-18 RX ORDER — ONDANSETRON 2 MG/ML
INJECTION INTRAMUSCULAR; INTRAVENOUS AS NEEDED
Status: DISCONTINUED | OUTPATIENT
Start: 2020-09-18 | End: 2020-09-18 | Stop reason: HOSPADM

## 2020-09-18 RX ORDER — NEOSTIGMINE METHYLSULFATE 1 MG/ML
INJECTION, SOLUTION INTRAVENOUS AS NEEDED
Status: DISCONTINUED | OUTPATIENT
Start: 2020-09-18 | End: 2020-09-18 | Stop reason: HOSPADM

## 2020-09-18 RX ORDER — DOCUSATE SODIUM 100 MG/1
100 CAPSULE, LIQUID FILLED ORAL 2 TIMES DAILY
Qty: 20 CAP | Refills: 2 | Status: SHIPPED | OUTPATIENT
Start: 2020-09-18 | End: 2020-12-17

## 2020-09-18 RX ORDER — ROCURONIUM BROMIDE 10 MG/ML
INJECTION, SOLUTION INTRAVENOUS AS NEEDED
Status: DISCONTINUED | OUTPATIENT
Start: 2020-09-18 | End: 2020-09-18 | Stop reason: HOSPADM

## 2020-09-18 RX ORDER — DIPHENHYDRAMINE HYDROCHLORIDE 50 MG/ML
12.5 INJECTION, SOLUTION INTRAMUSCULAR; INTRAVENOUS AS NEEDED
Status: DISCONTINUED | OUTPATIENT
Start: 2020-09-18 | End: 2020-09-18 | Stop reason: HOSPADM

## 2020-09-18 RX ORDER — OXYCODONE HYDROCHLORIDE 5 MG/1
5 TABLET ORAL
Qty: 12 TAB | Refills: 0 | Status: SHIPPED | OUTPATIENT
Start: 2020-09-18 | End: 2020-09-21

## 2020-09-18 RX ORDER — MIDAZOLAM HYDROCHLORIDE 1 MG/ML
INJECTION, SOLUTION INTRAMUSCULAR; INTRAVENOUS AS NEEDED
Status: DISCONTINUED | OUTPATIENT
Start: 2020-09-18 | End: 2020-09-18 | Stop reason: HOSPADM

## 2020-09-18 RX ORDER — HYDROMORPHONE HYDROCHLORIDE 1 MG/ML
.25-1 INJECTION, SOLUTION INTRAMUSCULAR; INTRAVENOUS; SUBCUTANEOUS
Status: DISCONTINUED | OUTPATIENT
Start: 2020-09-18 | End: 2020-09-18 | Stop reason: HOSPADM

## 2020-09-18 RX ORDER — LIDOCAINE HYDROCHLORIDE 20 MG/ML
INJECTION, SOLUTION EPIDURAL; INFILTRATION; INTRACAUDAL; PERINEURAL AS NEEDED
Status: DISCONTINUED | OUTPATIENT
Start: 2020-09-18 | End: 2020-09-18 | Stop reason: HOSPADM

## 2020-09-18 RX ORDER — FENTANYL CITRATE 50 UG/ML
INJECTION, SOLUTION INTRAMUSCULAR; INTRAVENOUS AS NEEDED
Status: DISCONTINUED | OUTPATIENT
Start: 2020-09-18 | End: 2020-09-18 | Stop reason: HOSPADM

## 2020-09-18 RX ORDER — LIDOCAINE HYDROCHLORIDE 10 MG/ML
0.1 INJECTION, SOLUTION EPIDURAL; INFILTRATION; INTRACAUDAL; PERINEURAL AS NEEDED
Status: CANCELLED | OUTPATIENT
Start: 2020-09-18

## 2020-09-18 RX ADMIN — Medication 10 MG: at 13:13

## 2020-09-18 RX ADMIN — MIDAZOLAM HYDROCHLORIDE 1 MG: 2 INJECTION, SOLUTION INTRAMUSCULAR; INTRAVENOUS at 12:50

## 2020-09-18 RX ADMIN — LIDOCAINE HYDROCHLORIDE 100 MG: 20 INJECTION, SOLUTION EPIDURAL; INFILTRATION; INTRACAUDAL; PERINEURAL at 12:54

## 2020-09-18 RX ADMIN — INSULIN LISPRO 2 UNITS: 100 INJECTION, SOLUTION INTRAVENOUS; SUBCUTANEOUS at 00:00

## 2020-09-18 RX ADMIN — GLYCOPYRROLATE 0.4 MG: 0.2 INJECTION INTRAMUSCULAR; INTRAVENOUS at 13:44

## 2020-09-18 RX ADMIN — PIPERACILLIN AND TAZOBACTAM 3.38 G: 3; .375 INJECTION, POWDER, LYOPHILIZED, FOR SOLUTION INTRAVENOUS at 23:49

## 2020-09-18 RX ADMIN — SODIUM CHLORIDE, SODIUM LACTATE, POTASSIUM CHLORIDE, AND CALCIUM CHLORIDE 125 ML/HR: 600; 310; 30; 20 INJECTION, SOLUTION INTRAVENOUS at 06:39

## 2020-09-18 RX ADMIN — INSULIN LISPRO 5 UNITS: 100 INJECTION, SOLUTION INTRAVENOUS; SUBCUTANEOUS at 18:09

## 2020-09-18 RX ADMIN — Medication 10 ML: at 15:10

## 2020-09-18 RX ADMIN — INSULIN LISPRO 2 UNITS: 100 INJECTION, SOLUTION INTRAVENOUS; SUBCUTANEOUS at 23:48

## 2020-09-18 RX ADMIN — FENTANYL CITRATE 25 MCG: 0.05 INJECTION, SOLUTION INTRAMUSCULAR; INTRAVENOUS at 13:51

## 2020-09-18 RX ADMIN — LISINOPRIL 40 MG: 20 TABLET ORAL at 09:38

## 2020-09-18 RX ADMIN — Medication 10 MG: at 13:36

## 2020-09-18 RX ADMIN — PROPOFOL 160 MG: 10 INJECTION, EMULSION INTRAVENOUS at 12:54

## 2020-09-18 RX ADMIN — Medication 10 MG: at 13:08

## 2020-09-18 RX ADMIN — MORPHINE SULFATE 2 MG: 2 INJECTION, SOLUTION INTRAMUSCULAR; INTRAVENOUS at 04:21

## 2020-09-18 RX ADMIN — INDOCYANINE GREEN AND WATER 7.5 MG: KIT at 12:45

## 2020-09-18 RX ADMIN — ROCURONIUM BROMIDE 50 MG: 10 INJECTION INTRAVENOUS at 12:55

## 2020-09-18 RX ADMIN — CARVEDILOL 12.5 MG: 12.5 TABLET, FILM COATED ORAL at 17:15

## 2020-09-18 RX ADMIN — Medication 10 ML: at 04:21

## 2020-09-18 RX ADMIN — INSULIN LISPRO 2 UNITS: 100 INJECTION, SOLUTION INTRAVENOUS; SUBCUTANEOUS at 06:38

## 2020-09-18 RX ADMIN — Medication 3 MG: at 13:44

## 2020-09-18 RX ADMIN — ACETAMINOPHEN 650 MG: 325 TABLET ORAL at 11:03

## 2020-09-18 RX ADMIN — FENTANYL CITRATE 50 MCG: 0.05 INJECTION, SOLUTION INTRAMUSCULAR; INTRAVENOUS at 12:52

## 2020-09-18 RX ADMIN — PIPERACILLIN AND TAZOBACTAM 3.38 G: 3; .375 INJECTION, POWDER, LYOPHILIZED, FOR SOLUTION INTRAVENOUS at 17:15

## 2020-09-18 RX ADMIN — ENOXAPARIN SODIUM 40 MG: 40 INJECTION SUBCUTANEOUS at 20:49

## 2020-09-18 RX ADMIN — CARVEDILOL 12.5 MG: 12.5 TABLET, FILM COATED ORAL at 09:33

## 2020-09-18 RX ADMIN — PIPERACILLIN AND TAZOBACTAM 3.38 G: 3; .375 INJECTION, POWDER, LYOPHILIZED, FOR SOLUTION INTRAVENOUS at 09:08

## 2020-09-18 RX ADMIN — KETOROLAC TROMETHAMINE 30 MG: 30 INJECTION INTRAMUSCULAR; INTRAVENOUS at 13:47

## 2020-09-18 RX ADMIN — Medication 10 ML: at 21:07

## 2020-09-18 RX ADMIN — ONDANSETRON HYDROCHLORIDE 4 MG: 2 SOLUTION INTRAMUSCULAR; INTRAVENOUS at 13:43

## 2020-09-18 NOTE — OP NOTES
Patient: Taz Bradford MRN: [id number]  SSN: [id number]    YOB: 1940  Age: 79 y.o. Sex: male        OPERATIVE REPORT - LAPAROSCOPIC ASSISTED ROBOTIC                                                                  MULTIPORT CHOLECYSTECTOMY    PREOPERATIVE DIAGNOSIS: Gallstone pancreatitis. POSTOPERATIVE DIAGNOSIS: same     OPERATIVE PROCEDURE:  Robotic assisted laparoscopic multiport cholecystectomy. SURGEON: Berenice Perez MD    ANESTHESIA: General.    ANESTHESIOLOGIST: General    COMPLICATIONS: None    ESTIMATED BLOOD LOSS: Minimal.    INDICATION: Documented in the history and physical.    FINDINGS: narrow cystic duct, distended gallbladder    PROCEDURE: Patient was brought in the room and placed supine on the operating table. After general anesthesia induction and placement of endotracheal tube, patient's  abdomen was prepped and draped in standard surgical fashion. The  laparoscopic camera and CO2 insufflation apparatus were affixed to the  drapes in the usual fashion. Through a supraumbilical incision, a Veress needle was introduced and its  intraperitoneal position was confirmed with low intra-abdominal initial pressures. CO2 insufflation was connected and pneumoperitoneum was created and maintained at 15 mmHg. An 8-mm robotic trocar was introduced and 8mm robotic camera  Was passed through and immediate area was inspected and it was confirmed that there was no  intraabdominal injury during introduction of pneumoperitoneum and the  trocar. Under direct vision, 2 8-mm robotic ports were positioned, one to the  Left upper quadrant, one at the right upper quadrant, and another 5mm port in the right  Lower abdomen. Patient was positioned in reverse Trendelenburg with a tilt to the  left. The fundus was grasped and lifted up towards the diaphragm. The  infundibulum was retracted laterally and inferiorly after releasing the  adhesions.     The junction of the cystic duct and gallbladder was clearly identified, and  an adequate length of the cystic duct was dissected out. Similarly, the cystic  artery was also dissected out and an adequate length was displayed. Critical view of Calot's triangle was obtained and the structures were clearly identified. After this was satisfactorily done, the cystic duct was doubly clipped on proximal side and single clip on distal side and divided. The cystic artery was also singly clipped on both sides and divided. The gallbladder was then gently dissected off from the liver bed  using electrocautery and achieving hemostasis. as the dissection proceeded  upwards towards the fundus. The gallbladder was thus removed from its bed. Using a 5mm Endobag through the umbilical port, the gallbladder  was removed intact. Liver bed was inspected. No bleeding or bile leak was noted. The umbilical port fascia was closed with 0 Vicryl with a figure of 8 stitch. All incisions had skin approximated with subcuticular 4-0 Vicryl   with Dermabond to the skin. Marcaine  was infiltrated into each port site. SPECIMEN: Gallbladder. COUNTS: Correct at the completion of surgery.     Norbert Diaz MD

## 2020-09-18 NOTE — PROGRESS NOTES
9/18/2020   CARE MANAGEMENT NOTE:  CM reviewed EMR for clinical updates. Pt was admitted with acute pancreatitis. Reportedly, pt resides alone. Dtr Gilberto Alvarado ([phone]) is the primary family contact. RUR 12%    Transition Plan of Care:  1. Surgery plans OR today for robotic lucas  2. Outpt MRI/CT to r/o mass per GI  3. Pt to arrange his own transportation home via dtr. CM will continue to follow pt until discharged.   Gilda

## 2020-09-18 NOTE — ANESTHESIA PREPROCEDURE EVALUATION
Relevant Problems   No relevant active problems       Anesthetic History   No history of anesthetic complications            Review of Systems / Medical History  Patient summary reviewed and pertinent labs reviewed    Pulmonary        Sleep apnea: CPAP           Neuro/Psych   Within defined limits           Cardiovascular    Hypertension: well controlled          Past MI (2015), CAD, cardiac stents (2015) and hyperlipidemia    Exercise tolerance: >4 METS  Comments: Neg stress test 9/17/20, EF 50%   GI/Hepatic/Renal     GERD    Renal disease: stones       Endo/Other    Diabetes         Other Findings            Physical Exam    Airway  Mallampati: III  TM Distance: 4 - 6 cm  Neck ROM: normal range of motion   Mouth opening: Normal     Cardiovascular    Rhythm: regular  Rate: normal         Dental    Dentition: Upper dentition intact and Lower dentition intact  Comments: Missing few teeth, small chip on front tooth, prominent incisiors   Pulmonary  Breath sounds clear to auscultation               Abdominal         Other Findings            Anesthetic Plan    ASA: 3  Anesthesia type: general          Induction: Intravenous  Anesthetic plan and risks discussed with: Patient

## 2020-09-18 NOTE — BRIEF OP NOTE
Brief Postoperative Note    Patient: Heraclio Tsai  YOB: 1940  MRN: [id number]    Date of Procedure: 9/18/2020     Pre-Op Diagnosis: GALLSTONE PANCREATITIS    Post-Op Diagnosis: Same as preoperative diagnosis.       Procedure(s):  ROBOTIC CHOLECYSTECTOMY WITH FIREFLY    Surgeon(s):  Melanie Lucas MD    Surgical Assistant: None    Anesthesia: General     Estimated Blood Loss (mL): Minimal    Complications: None    Specimens: * No specimens in log *     Implants: * No implants in log *    Drains: * No LDAs found *    Findings: narrow cystic duct    Electronically Signed by Cindy Loza MD on 9/18/2020 at 1:53 PM

## 2020-09-18 NOTE — PROGRESS NOTES
William Mayes [facility] [address]  [address] [facility], [address] [place] [address]  [phone]      Medical Progress Note      NAME: Nichole Julian   :  1940  MRM:  [id number]    Date of service: 2020  10:05 AM       Assessment and Plan:   1. Gallstone pancreatitis (2020). Keep n.p.o. MRCP result noted. cont IV fluids, IV morphine for pain control. lipase normalized. GI and surgical evaluation appreciated.     2. Cholelithiasis. Evaluated by surgery and planned for cholecystectomy on . on ABx per general surgery      3. Chest pressure. Hx of CAD (coronary artery disease) (2015). evaluated by surgery. Has stress test on . Continue Coreg but hold aspirin for possible cholecystectomy     4. HTN (hypertension) (2020). Continue Coreg and lisinopril, increase dose as BP is high      5.  DM (diabetes mellitus) ([facility] [address] (2020). Hold glipizide and cover with SSI for now      6.  Leukocytosis. Likely secondary to #1. Monitor      7. Obesity/ELLYN. Would benefit from weight loss. May use own CPAP     Code status: full          Subjective:     Chief Complaint[de-identified] Patient was seen and examined as a follow up for pancreatitis. Chart was reviewed. Had diarrhea yesterday, but now resolved. ROS:  (bold if positive, if negative)    Tolerating PT  Tolerating Diet        Objective:     Last 24hrs VS reviewed since prior progress note.  Most recent are:    Visit Vitals  BP (!) 163/72 (BP 1 Location: Right arm, BP Patient Position: At rest)   Pulse 66   Temp 97.5 °F (36.4 °C)   Resp 16   Ht 5' 5\" (1.651 m)   Wt 85.7 kg (189 lb)   SpO2 97%   BMI 31.45 kg/m²     SpO2 Readings from Last 6 Encounters:   20 97%   20 96%   20 98%   19 98%   18 97%   17 97%            Intake/Output Summary (Last 24 hours) at 2020 0846  Last data filed at 2020 0807  Gross per 24 hour   Intake    Output 425 ml   Net -425 ml        Physical Exam:    Gen: Well-developed, well-nourished, in no acute distress  HEENT:  Pink conjunctivae, PERRL, hearing intact to voice, moist mucous membranes  Neck:  Supple, without masses, thyroid non-tender  Resp:  No accessory muscle use, clear breath sounds without wheezes rales or rhonchi  Card:  No murmurs, normal S1, S2 without thrills, bruits or peripheral edema  Abd:  Soft, non-tender, non-distended, normoactive bowel sounds are present, no palpable organomegaly and no detectable hernias  Lymph:  No cervical or inguinal adenopathy  Musc:  No cyanosis or clubbing  Skin:  No rashes or ulcers, skin turgor is good  Neuro:  Cranial nerves are grossly intact, no focal motor weakness, follows commands appropriately  Psych:  Good insight, oriented to person, place and time, alert  __________________________________________________________________  Medications Reviewed: (see below)  Medications:     Current Facility-Administered Medications   Medication Dose Route Frequency    lisinopriL (PRINIVIL, ZESTRIL) tablet 20 mg  20 mg Oral DAILY    indocyanine green (IC GREEN) solr 42.85 mg  0.5 mg/kg IntraVENous NOW    carvediloL (COREG) tablet 12.5 mg  12.5 mg Oral BID WITH MEALS    sodium chloride (NS) flush 5-40 mL  5-40 mL IntraVENous Q8H    sodium chloride (NS) flush 5-40 mL  5-40 mL IntraVENous PRN    enoxaparin (LOVENOX) injection 40 mg  40 mg SubCUTAneous Q24H    lactated Ringers infusion  125 mL/hr IntraVENous CONTINUOUS    morphine injection 2 mg  2 mg IntraVENous Q4H PRN    ondansetron (ZOFRAN) injection 4 mg  4 mg IntraVENous Q6H PRN    insulin lispro (HUMALOG) injection   SubCUTAneous Q6H    glucose chewable tablet 16 g  4 Tab Oral PRN    dextrose (D50W) injection syrg 12.5-25 g  12.5-25 g IntraVENous PRN    glucagon (GLUCAGEN) injection 1 mg  1 mg IntraMUSCular PRN    hydrALAZINE (APRESOLINE) 20 mg/mL injection 10 mg  10 mg IntraVENous Q6H PRN    piperacillin-tazobactam (ZOSYN) 3.375 g in 0.9% sodium chloride (MBP/ADV) 100 mL  3.375 g IntraVENous Q8H    fluticasone propionate (FLONASE) 50 mcg/actuation nasal spray 2 Spray  2 Spray Both Nostrils DAILY PRN    acetaminophen (TYLENOL) tablet 650 mg  650 mg Oral Q6H PRN        Lab Data Reviewed: (see below)  Lab Review:     Recent Labs     09/18/20  0630 09/17/20  0350 09/16/20  0905   WBC 22.5* 24.2* 24.1*   HGB 12.7 13.7 15.0   HCT 36.6 40.2 43.4    215 247     Recent Labs     09/18/20  0630 09/17/20  0350 09/16/20  0905   * 134* 132*   K 3.5 3.6 4.3    105 103   CO2 22 21 20*   * 185* 284*   BUN 14 14 15   CREA 0.74 0.88 1.05   CA 8.3* 8.4* 8.5   MG 2.2  --   --    PHOS 2.3*  --   --    ALB 2.8* 3.1* 3.6   TBILI 1.1* 1.4* 1.2*   ALT 13 16 23     Lab Results   Component Value Date/Time    Glucose (POC) 149 (H) 09/18/2020 05:59 AM    Glucose (POC) 163 (H) 09/17/2020 11:49 PM    Glucose (POC) 181 (H) 09/17/2020 06:03 PM    Glucose (POC) 190 (H) 09/17/2020 12:07 PM    Glucose (POC) 188 (H) 09/17/2020 06:12 AM     No results for input(s): PH, PCO2, PO2, HCO3, FIO2 in the last 72 hours. No results for input(s): INR, INREXT, INREXT in the last 72 hours. All Micro Results     None          I have reviewed notes of prior 24hr. Other pertinent lab:      Total time spent with patient: Jodee 59 discussed with: Patient, Nursing Staff and >50% of time spent in counseling and coordination of care    Discussed:  Care Plan    Prophylaxis:  Lovenox    Disposition:  Home w/Family           ___________________________________________________    Attending Physician: Ry Pace MD

## 2020-09-18 NOTE — ANESTHESIA POSTPROCEDURE EVALUATION
Procedure(s):  ROBOTIC ASSISTED CHOLECYSTECTOMY WITH FIREFLY. general    Anesthesia Post Evaluation      Multimodal analgesia: multimodal analgesia used between 6 hours prior to anesthesia start to PACU discharge  Patient location during evaluation: bedside  Patient participation: complete - patient participated  Level of consciousness: awake  Pain management: adequate  Airway patency: patent  Anesthetic complications: no  Cardiovascular status: acceptable  Respiratory status: acceptable  Hydration status: acceptable        INITIAL Post-op Vital signs:   Vitals Value Taken Time   /58 9/18/2020  2:55 PM   Temp 36.6 °C (97.8 °F) 9/18/2020  2:37 PM   Pulse 66 9/18/2020  2:56 PM   Resp 16 9/18/2020  2:56 PM   SpO2 96 % 9/18/2020  2:56 PM   Vitals shown include unvalidated device data.

## 2020-09-18 NOTE — PROGRESS NOTES
SURGERY PROGRESS NOTE      Admit Date: 2020    POD * No surgery date entered *    Procedure: Procedure(s):  ROBOTIC CHOLECYSTECTOMY WITH FIREFLY      Subjective:   Anxious for surgery today      Objective:     Visit Vitals  BP (!) 185/85   Pulse 63   Temp 97.5 °F (36.4 °C)   Resp 16   Ht 5' 5\" (1.651 m)   Wt 85.7 kg (189 lb)   SpO2 97%   BMI 31.45 kg/m²        Temp (24hrs), Av.8 °F (36.6 °C), Min:97.5 °F (36.4 °C), Max:98.3 °F (36.8 °C)      Physical Exam:     Abdomen:  Soft. Non-tender, non-distended.          Lab Results   Component Value Date/Time    WBC 22.5 (H) 2020 06:30 AM    HGB 12.7 2020 06:30 AM    HCT 36.6 2020 06:30 AM    PLATELET 700  06:30 AM    MCV 88.6 2020 06:30 AM     Lab Results   Component Value Date/Time    GFR est non-AA >60 2020 06:30 AM    GFR est AA >60 2020 06:30 AM    Creatinine 0.74 2020 06:30 AM    BUN 14 2020 06:30 AM    Sodium 134 (L) 2020 06:30 AM    Potassium 3.5 2020 06:30 AM    Chloride 103 2020 06:30 AM    CO2 22 2020 06:30 AM    Magnesium 2.2 2020 06:30 AM    Phosphorus 2.3 (L) 2020 06:30 AM       Assessment:     Active Problems:    CAD (coronary artery disease) (2015)      Acute pancreatitis (2020)      HTN (hypertension) (2020)      [facility])      Pancreatitis (2020)        Plan/Recommendations/Medical Decision Making:   OR today for cholecystectomy  Possible d/c home tonight or tomorrow am

## 2020-09-18 NOTE — PERIOP NOTES
TRANSFER - OUT REPORT:    Verbal report given to ESTUARDO Esparza on Jevon Light  being transferred to [facility] [phone] for routine post - op       Report consisted of patients Situation, Background, Assessment and   Recommendations(SBAR). Information from the following report(s) SBAR, OR Summary, Procedure Summary, Intake/Output, Recent Results and Cardiac Rhythm nsr was reviewed with the receiving nurse. Lines:   Peripheral IV 09/16/20 Left Antecubital (Active)   Site Assessment Clean, dry, & intact 09/18/20 1457   Phlebitis Assessment 0 09/18/20 1457   Infiltration Assessment 0 09/18/20 1457   Dressing Status Clean, dry, & intact; Occlusive 09/18/20 1457   Dressing Type Tape;Transparent 09/18/20 1457   Hub Color/Line Status Pink; Infusing 09/18/20 1457   Action Taken Open ports on tubing capped 09/18/20 1217   Alcohol Cap Used Yes 09/18/20 1217        Opportunity for questions and clarification was provided.       Patient transported with:   Registered Nurse

## 2020-09-18 NOTE — PROGRESS NOTES
Bedside and Verbal shift change report given to Dina Wolfe (oncoming nurse) by Ming Fatima (offgoing nurse). Report included the following information SBAR, Kardex and MAR.

## 2020-09-18 NOTE — PROGRESS NOTES
Bedside and Verbal shift change report given to [facility], RN (oncoming nurse) by Charmaine Christie RN (offgoing nurse). Report included the following information SBAR, Intake/Output, MAR and Recent Results.  Pt verbalized understanding if NPO for surgery this am.

## 2020-09-19 LAB
ALBUMIN SERPL-MCNC: 2.6 G/DL (ref 3.5–5)
ALBUMIN/GLOB SERPL: 0.7 {RATIO} (ref 1.1–2.2)
ALP SERPL-CCNC: 69 U/L (ref 45–117)
ALT SERPL-CCNC: 41 U/L (ref 12–78)
ANION GAP SERPL CALC-SCNC: 8 MMOL/L (ref 5–15)
AST SERPL-CCNC: 50 U/L (ref 15–37)
BASOPHILS # BLD: 0 K/UL (ref 0–0.1)
BASOPHILS NFR BLD: 0 % (ref 0–1)
BILIRUB SERPL-MCNC: 0.8 MG/DL (ref 0.2–1)
BUN SERPL-MCNC: 17 MG/DL (ref 6–20)
BUN/CREAT SERPL: 18 (ref 12–20)
CALCIUM SERPL-MCNC: 8.3 MG/DL (ref 8.5–10.1)
CANCER AG19-9 SERPL-ACNC: 25 U/ML (ref 0–35)
CHLORIDE SERPL-SCNC: 101 MMOL/L (ref 97–108)
CO2 SERPL-SCNC: 24 MMOL/L (ref 21–32)
CREAT SERPL-MCNC: 0.96 MG/DL (ref 0.7–1.3)
DIFFERENTIAL METHOD BLD: ABNORMAL
EOSINOPHIL # BLD: 0.1 K/UL (ref 0–0.4)
EOSINOPHIL NFR BLD: 1 % (ref 0–7)
ERYTHROCYTE [DISTWIDTH] IN BLOOD BY AUTOMATED COUNT: 12.6 % (ref 11.5–14.5)
GLOBULIN SER CALC-MCNC: 3.8 G/DL (ref 2–4)
GLUCOSE BLD STRIP.AUTO-MCNC: 162 MG/DL (ref 65–100)
GLUCOSE SERPL-MCNC: 135 MG/DL (ref 65–100)
HCT VFR BLD AUTO: 36 % (ref 36.6–50.3)
HGB BLD-MCNC: 12.5 G/DL (ref 12.1–17)
IMM GRANULOCYTES # BLD AUTO: 0.1 K/UL (ref 0–0.04)
IMM GRANULOCYTES NFR BLD AUTO: 1 % (ref 0–0.5)
LYMPHOCYTES # BLD: 0.9 K/UL (ref 0.8–3.5)
LYMPHOCYTES NFR BLD: 5 % (ref 12–49)
MCH RBC QN AUTO: 30.9 PG (ref 26–34)
MCHC RBC AUTO-ENTMCNC: 34.7 G/DL (ref 30–36.5)
MCV RBC AUTO: 89.1 FL (ref 80–99)
MONOCYTES # BLD: 1.3 K/UL (ref 0–1)
MONOCYTES NFR BLD: 7 % (ref 5–13)
NEUTS SEG # BLD: 15 K/UL (ref 1.8–8)
NEUTS SEG NFR BLD: 86 % (ref 32–75)
NRBC # BLD: 0 K/UL (ref 0–0.01)
NRBC BLD-RTO: 0 PER 100 WBC
PLATELET # BLD AUTO: 212 K/UL (ref 150–400)
PMV BLD AUTO: 10 FL (ref 8.9–12.9)
POTASSIUM SERPL-SCNC: 3.6 MMOL/L (ref 3.5–5.1)
PROT SERPL-MCNC: 6.4 G/DL (ref 6.4–8.2)
RBC # BLD AUTO: 4.04 M/UL (ref 4.1–5.7)
SERVICE CMNT-IMP: ABNORMAL
SODIUM SERPL-SCNC: 133 MMOL/L (ref 136–145)
WBC # BLD AUTO: 17.4 K/UL (ref 4.1–11.1)

## 2020-09-19 PROCEDURE — 74011250637 HC RX REV CODE- 250/637: Performed by: INTERNAL MEDICINE

## 2020-09-19 PROCEDURE — 80053 COMPREHEN METABOLIC PANEL: CPT

## 2020-09-19 PROCEDURE — 36415 COLL VENOUS BLD VENIPUNCTURE: CPT

## 2020-09-19 PROCEDURE — 82962 GLUCOSE BLOOD TEST: CPT

## 2020-09-19 PROCEDURE — 74011636637 HC RX REV CODE- 636/637: Performed by: INTERNAL MEDICINE

## 2020-09-19 PROCEDURE — 85025 COMPLETE CBC W/AUTO DIFF WBC: CPT

## 2020-09-19 PROCEDURE — 74011250636 HC RX REV CODE- 250/636: Performed by: SURGERY

## 2020-09-19 PROCEDURE — 74011000258 HC RX REV CODE- 258: Performed by: SURGERY

## 2020-09-19 RX ADMIN — LISINOPRIL 40 MG: 20 TABLET ORAL at 08:23

## 2020-09-19 RX ADMIN — INSULIN LISPRO 2 UNITS: 100 INJECTION, SOLUTION INTRAVENOUS; SUBCUTANEOUS at 06:24

## 2020-09-19 RX ADMIN — ACETAMINOPHEN 650 MG: 325 TABLET ORAL at 08:23

## 2020-09-19 RX ADMIN — CARVEDILOL 12.5 MG: 12.5 TABLET, FILM COATED ORAL at 08:23

## 2020-09-19 RX ADMIN — FLUTICASONE PROPIONATE 2 SPRAY: 50 SPRAY, METERED NASAL at 08:29

## 2020-09-19 RX ADMIN — PIPERACILLIN AND TAZOBACTAM 3.38 G: 3; .375 INJECTION, POWDER, LYOPHILIZED, FOR SOLUTION INTRAVENOUS at 08:23

## 2020-09-19 RX ADMIN — Medication 10 ML: at 05:10

## 2020-09-19 NOTE — DISCHARGE INSTRUCTIONS
Patient Education        HOSPITALIST DISCHARGE INSTRUCTIONS  NAME: Rehan Farooq   :  1940   MRN:  [id number]     Date/Time:  2020 11:11 AM    ADMIT DATE: 2020     DISCHARGE DATE: 2020     ADMITTING DIAGNOSIS:  Gallstone pancreatitis. DISCHARGE DIAGNOSIS:  Gallstone pancreatitis    MEDICATIONS:    · It is important that you take the medication exactly as they are prescribed. · Keep your medication in the bottles provided by the pharmacist and keep a list of the medication names, dosages, and times to be taken in your wallet. · Do not take other medications without consulting your doctor     Pain Management: per above medications    What to do at Home    Recommended diet:  Diabetic Diet    Recommended activity: Activity as tolerated    1) Return to the hospital if you feel worse    2) If you experience any of the following symptoms then please call your primary care physician or return to the emergency room if you cannot get hold of your doctor:  Fever, chills, nausea, vomiting, diarrhea, change in mentation, falling, bleeding, shortness of breath, chest pain, severe headache, severe abdominal pain. Follow Up: Follow-up Information     Follow up With Specialties Details Why Contact Info    Sergei Burnham MD Family Medicine In 3 days For diabetes and blood pressure management  [address] [facility]  [phone]      Rosalba Caal MD Cardiology, Internal Medicine In 1 week BP managment  [address]  [phone]      Skyla Quiroz MD General Surgery In 2 weeks Follow up after surgery and will order follow up of ct abd/pelvis due to concern for pancreatic mass  [address] [facility]  [phone]              Information obtained by :  I understand that if any problems occur once I am at home I am to contact my physician.     I understand and acknowledge receipt of the instructions indicated above. Physician's or R.N.'s Signature                                                                  Date/Time                                                                                                                                              Patient or Representative Signature                                                          Date/Time   Gallbladder Removal Surgery: What to Expect at Home  Your Recovery  After your surgery, you will likely feel weak and tired for several days after you return home. Your belly may be swollen. If you had laparoscopic surgery, you may also have pain in your shoulder for about 24 hours. You may have gas or need to burp a lot at first. A few people get diarrhea. The diarrhea usually goes away in 2 to 4 weeks, but it may last longer. How quickly you recover depends on whether you had a laparoscopic or open surgery. · For a laparoscopic surgery, most people can go back to work or their normal routine in 1 to 2 weeks. But it may take longer, depending on the type of work you do. · For an open surgery, it will probably take 4 to 6 weeks before you get back to your normal routine. This care sheet gives you a general idea about how long it will take for you to recover. However, each person recovers at a different pace. Follow the steps below to get better as quickly as possible. How can you care for yourself at home? Activity    · Rest when you feel tired. Getting enough sleep will help you recover.     · Try to walk each day. Start out by walking a little more than you did the day before. Gradually increase the amount you walk. Walking boosts blood flow and helps prevent pneumonia and constipation.     · For about 2 to 4 weeks, avoid lifting anything that would make you strain.  This may include a child, heavy grocery bags and milk containers, a heavy briefcase or backpack, cat litter or dog food bags, or a vacuum .     · Avoid strenuous activities, such as biking, jogging, weightlifting, and aerobic exercise, until your doctor says it is okay.     · You may shower 24 to 48 hours after surgery, if your doctor okays it. Pat the cut (incision) dry. Do not take a bath for the first 2 weeks, or until your doctor tells you it is okay.     · You may drive when you are no longer taking pain medicine and can quickly move your foot from the gas pedal to the brake. You must also be able to sit comfortably for a long period of time, even if you do not plan to go far. You might get caught in traffic.     · For a laparoscopic surgery, most people can go back to work or their normal routine in 1 to 2 weeks, but it may take longer. For an open surgery, it will probably take 4 to 6 weeks before you get back to your normal routine.     · Your doctor will tell you when you can have sex again. Diet    · Eat smaller meals more often instead of fewer larger meals. You can eat a normal diet, but avoid eating fatty foods for about 1 month. Fatty foods include hamburger, whole milk, cheese, and many snack foods. If your stomach is upset, try bland, low-fat foods like plain rice, broiled chicken, toast, and yogurt.     · Drink plenty of fluids (unless your doctor tells you not to).   · If you have diarrhea, try avoiding spicy foods, dairy products, fatty foods, and alcohol. You can also watch to see if specific foods cause it, and stop eating them. If the diarrhea continues for more than 2 weeks, talk to your doctor.     · You may notice that your bowel movements are not regular right after your surgery. This is common. Try to avoid constipation and straining with bowel movements. You may want to take a fiber supplement every day.  If you have not had a bowel movement after a couple of days, ask your doctor about taking a mild laxative. Medicines    · Your doctor will tell you if and when you can restart your medicines. He or she will also give you instructions about taking any new medicines.     · If you take aspirin or some other blood thinner, ask your doctor if and when to start taking it again. Make sure that you understand exactly what your doctor wants you to do.     · Take pain medicines exactly as directed. ? If the doctor gave you a prescription medicine for pain, take it as prescribed. ? If you are not taking a prescription pain medicine, take an over-the-counter medicine such as acetaminophen (Tylenol), ibuprofen (Advil, Motrin), or naproxen (Aleve). Read and follow all instructions on the label. ? Do not take two or more pain medicines at the same time unless the doctor told you to. Many pain medicines contain acetaminophen, which is Tylenol. Too much Tylenol can be harmful.     · If you think your pain medicine is making you sick to your stomach:  ? Take your medicine after meals (unless your doctor tells you not to). ? Ask your doctor for a different pain medicine.     · If your doctor prescribed antibiotics, take them as directed. Do not stop taking them just because you feel better. You need to take the full course of antibiotics. Incision care    · If you have strips of tape on the incision, or cut, leave the tape on for a week or until it falls off.     · After 24 to 48 hours, wash the area daily with warm, soapy water, and pat it dry.     · You may have staples to hold the cut together. Keep them dry until your doctor takes them out. This is usually in 7 to 10 days.     · Keep the area clean and dry. You may cover it with a gauze bandage if it weeps or rubs against clothing. Change the bandage every day. Ice    · To reduce swelling and pain, put ice or a cold pack on your belly for 10 to 20 minutes at a time. Do this every 1 to 2 hours. Put a thin cloth between the ice and your skin.    Follow-up care is a key part of your treatment and safety. Be sure to make and go to all appointments, and call your doctor if you are having problems. It's also a good idea to know your test results and keep a list of the medicines you take. When should you call for help? Call 911 anytime you think you may need emergency care. For example, call if:    · You passed out (lost consciousness).     · You are short of breath. .   Call your doctor now or seek immediate medical care if:    · You are sick to your stomach and cannot drink fluids.     · You have pain that does not get better when you take your pain medicine.     · You cannot pass stools or gas.     · You have signs of infection, such as:  ? Increased pain, swelling, warmth, or redness. ? Red streaks leading from the incision. ? Pus draining from the incision. ? A fever.     · Bright red blood has soaked through the bandage over your incision.     · You have loose stitches, or your incision comes open.     · You have signs of a blood clot in your leg (called a deep vein thrombosis), such as:  ? Pain in your calf, back of knee, thigh, or groin. ? Redness and swelling in your leg or groin. Watch closely for any changes in your health, and be sure to contact your doctor if you have any problems. Where can you learn more? Go to http://www.gray.com/  Enter F357 in the search box to learn more about \"Gallbladder Removal Surgery: What to Expect at Home. \"  Current as of: April 15, 2020               Content Version: 12.6  © 2006-2020 [employer]. Care instructions adapted under license by [employer] (which disclaims liability or warranty for this information). If you have questions about a medical condition or this instruction, always ask your healthcare professional. [place] [address] any warranty or liability for your use of this information.

## 2020-09-19 NOTE — PROGRESS NOTES
Had extensive conversation about patient's BP with patient and daughter who is at bedside. Informed patient and daughter that lisinopril dose was increased while patient was here. Patient does not want higher dose of lisinopril. Also discussed with daughter that higher blood pressure while patient was here could have been related to pain gallbladder. Patient will remain on 10mg lisinopril and follow up with PCP and cardiology outpatient for possible dose adjustment.

## 2020-09-19 NOTE — PROGRESS NOTES
Discharge reviewed with patient, verbalize understanding. Peripheral IV removed. Made aware of meds available at preferred pharmacy. Post op instructions reviewed. Aware of follow up appointments to schedule. Daughter in to transport home.

## 2020-09-19 NOTE — PROGRESS NOTES
Bedside and Verbal shift change report given to [address] [place] [address] (oncoming nurse) by Sena Resendiz (offgoing nurse). Report included the following information SBAR, Intake/Output, MAR and Recent Results.

## 2020-09-19 NOTE — DISCHARGE SUMMARY
William Sugey [facility] [address]  [address]  [phone]    Physician Discharge Summary     Patient ID:  Rosalina Munoz  [id number]  79 y.o.  1940    Admit date: 9/16/2020    Discharge date and time: 9/19/2020 11:22 AM    Admission Diagnoses: Acute pancreatitis [K85.90]  Pancreatitis [K85.90]       Discharge Diagnoses:  Principal Diagnosis <principal problem not specified>                                            Active Problems:    CAD (coronary artery disease) (12/9/2015)      Acute pancreatitis (9/16/2020)      HTN (hypertension) (9/16/2020)      DM (diabetes mellitus) ([facility] [place] [facility] (9/16/2020)      Pancreatitis (9/16/2020)        Hospital Course: Mr. Lesley Bejarano is a 79 yr old male with a PMhx of DM, HTN and CAD who presented with gallstone pancreatitis. He underwent a laparoscopic assisted cholecystectomy on 9/18 without any complications. Of note, there was a concern for a pancreatic mass on his ct abd/pelvis. Surgery will order follow ct abd/pelvis when they see him for his 2 week post op appointment.      PCP: Silvano Mason MD     Consults: Cardiology, GI and General Surgery    Significant Diagnostic Studies: MRCP     Discharge Exam:  Physical Exam:    Gen: Well-developed, well-nourished, in no acute distress  HEENT:  Pink conjunctivae, PERRL, hearing intact to voice  Resp: No accessory muscle use, clear breath sounds without wheezes rales or rhonchi  Card: No murmurs, normal S1, S2 without thrills, bruits or peripheral edema  Abd:  Soft, non-tender, non-distended, normoactive bowel sounds are present, post op incisions w/o any discharge   Musc: No cyanosis or clubbing  Skin: No rashes or ulcers, skin turgor is good  Neuro:  Cranial nerves are grossly intact, no focal motor weakness, follows commands appropriately  Psych:  Good insight, oriented to person, place and time, alert    Disposition: home  Discharge Condition: Stable    Patient Instructions:   Current Discharge Medication List      START taking these medications    Details   oxyCODONE IR (ROXICODONE) 5 mg immediate release tablet Take 1 Tab by mouth every four (4) hours as needed for Pain for up to 3 days. Max Daily Amount: 30 mg.  Qty: 12 Tab, Refills: 0    Associated Diagnoses: Acute gallstone pancreatitis      docusate sodium (COLACE) 100 mg capsule Take 1 Cap by mouth two (2) times a day for 90 days. Qty: 20 Cap, Refills: 2         CONTINUE these medications which have NOT CHANGED    Details   acetaminophen (TYLENOL) 500 mg tablet Take 500 mg by mouth every six (6) hours as needed for Pain. carvediloL (COREG) 12.5 mg tablet TAKE 1 TABLET BY MOUTH TWICE A DAY  Qty: 180 Tab, Refills: 3      lisinopril (PRINIVIL, ZESTRIL) 10 mg tablet TAKE 1 TABLET BY MOUTH EVERY DAY  Qty: 90 Tab, Refills: 3      colchicine 0.6 mg tablet Take 0.6 mg by mouth as needed for Gout.      glipiZIDE SR (GLUCOTROL XL) 2.5 mg CR tablet Take 2.5 mg by mouth two (2) times daily (with meals). fluticasone (FLONASE) 50 mcg/actuation nasal spray 2 Sprays by Both Nostrils route as needed for Rhinitis. Associated Diagnoses: Coronary artery disease involving native coronary artery without angina pectoris      aspirin 81 mg chewable tablet Take 1 Tab by mouth daily. atorvastatin (LIPITOR) 10 mg tablet Take 10 mg by mouth nightly. Associated Diagnoses: Acute chest pain;  Abnormal nuclear stress test           Activity: Activity as tolerated  Diet: Diabetic Diet  Wound Care: Keep wound clean and apply antibacterial ointment as needed     Follow-up with  Follow-up Information     Follow up With Specialties Details Why Contact Info    Kalpana Peterson MD Family Medicine In 3 days For diabetes and blood pressure management  [address] [facility]  [phone]      Refugio Nuñez MD Cardiology, Internal Medicine In 1 week BP managment  [address]  [phone] Henry Kemp MD General Surgery In 2 weeks Follow up after surgery and will order follow up of ct abd/pelvis due to concern for pancreatic mass  [address] [facility]  [phone]            Follow-up tests/labs  will need f/u ct abd/eplvis in 6 weeks to follow up possible pancreatic mass     Signed:  Kervin Luong DO  9/19/2020  11:22 AM  **I personally spent 35 min on discharge**

## 2020-09-21 ENCOUNTER — PATIENT OUTREACH (OUTPATIENT)
Dept: CASE MANAGEMENT | Age: 80
End: 2020-09-21

## 2020-09-25 ENCOUNTER — OFFICE VISIT (OUTPATIENT)
Dept: CARDIOLOGY CLINIC | Age: 80
End: 2020-09-25
Payer: MEDICARE

## 2020-09-25 VITALS
RESPIRATION RATE: 20 BRPM | HEART RATE: 64 BPM | DIASTOLIC BLOOD PRESSURE: 80 MMHG | BODY MASS INDEX: 30.99 KG/M2 | SYSTOLIC BLOOD PRESSURE: 170 MMHG | WEIGHT: 186 LBS | HEIGHT: 65 IN | OXYGEN SATURATION: 98 %

## 2020-09-25 DIAGNOSIS — I25.10 CORONARY ARTERY DISEASE INVOLVING NATIVE CORONARY ARTERY OF NATIVE HEART, ANGINA PRESENCE UNSPECIFIED: ICD-10-CM

## 2020-09-25 DIAGNOSIS — I10 ESSENTIAL HYPERTENSION: Primary | ICD-10-CM

## 2020-09-25 PROCEDURE — G8427 DOCREV CUR MEDS BY ELIG CLIN: HCPCS | Performed by: INTERNAL MEDICINE

## 2020-09-25 PROCEDURE — G8536 NO DOC ELDER MAL SCRN: HCPCS | Performed by: INTERNAL MEDICINE

## 2020-09-25 PROCEDURE — G8753 SYS BP > OR = 140: HCPCS | Performed by: INTERNAL MEDICINE

## 2020-09-25 PROCEDURE — G8510 SCR DEP NEG, NO PLAN REQD: HCPCS | Performed by: INTERNAL MEDICINE

## 2020-09-25 PROCEDURE — 1101F PT FALLS ASSESS-DOCD LE1/YR: CPT | Performed by: INTERNAL MEDICINE

## 2020-09-25 PROCEDURE — G8754 DIAS BP LESS 90: HCPCS | Performed by: INTERNAL MEDICINE

## 2020-09-25 PROCEDURE — 1111F DSCHRG MED/CURRENT MED MERGE: CPT | Performed by: INTERNAL MEDICINE

## 2020-09-25 PROCEDURE — 99214 OFFICE O/P EST MOD 30 MIN: CPT | Performed by: INTERNAL MEDICINE

## 2020-09-25 PROCEDURE — G8417 CALC BMI ABV UP PARAM F/U: HCPCS | Performed by: INTERNAL MEDICINE

## 2020-09-25 PROCEDURE — G0463 HOSPITAL OUTPT CLINIC VISIT: HCPCS | Performed by: INTERNAL MEDICINE

## 2020-09-25 RX ORDER — LISINOPRIL 20 MG/1
20 TABLET ORAL DAILY
Qty: 30 TAB | Refills: 3 | Status: SHIPPED | OUTPATIENT
Start: 2020-09-25 | End: 2021-01-26

## 2020-09-25 NOTE — PROGRESS NOTES
Visit Vitals  BP (!) 170/80   Pulse 64   Resp 20   Ht 5' 5\" (1.651 m)   Wt 186 lb (84.4 kg)   SpO2 98%   BMI 30.95 kg/m²     Post hospitalization elevated BP

## 2020-09-25 NOTE — PROGRESS NOTES
Office Follow-up    NAME: Cristy Robb   :  1940  MRM:  [id number]    Date:  2020            Assessment:     Problem List  Date Reviewed: 2019          Codes Class Noted    Acute pancreatitis ICD-10-CM: K85.90  ICD-9-CM: 577.0  2020        HTN (hypertension) ICD-10-CM: I10  ICD-9-CM: 401.9  2020        DM (diabetes mellitus) ([facility] ICD-10-CM: E11.9  ICD-9-CM: 250.00  2020        Pancreatitis ICD-10-CM: K85.90  ICD-9-CM: 577.0  2020        CAD (coronary artery disease) ICD-10-CM: I25.10  ICD-9-CM: 414.00  2015        Chest pain ICD-10-CM: R07.9  ICD-9-CM: 786.50  2015        SOB (shortness of breath) ICD-10-CM: R06.02  ICD-9-CM: 786.05  2015                 Plan:     1. CAD/status post LAD TRELL stent ×2, POBA LCx: Continue ASA and statins. 2. Moderate aortic regurgitation: The aortic regurgitation is stable. 3. Hypertension: Blood pressure was elevated today. I will increase the lisinopril to 20 mg p.o. daily. Continue Coreg. 4. Mild aortic aneurysm: On his recent cardiac MRI his ascending aorta was 41 mm. Continue to monitor with 3 yearly CMRI. 5. Sleep apnea: Continue CPAP  6. Hyperlipidemia: Continue statins. 7. Recent Cholecystectomy 2020.  8. Return to see me as planned. Subjective:     Cristy Robb, a 79y.o. year-old who presents for followup. He has known history of CAD status post LAD TRELL stent ×2 and POBA of the left circumflex artery. He was admitted last week with acute cholecystitis and underwent cholecystectomy on 2020. He is returning today for follow-up. Prior to cholecystectomy he underwent a stress test which was negative for ischemia. Currently has no symptoms of chest pain, shortness of breath, lightheadedness or dizziness. His blood pressure is elevated today on office visit.       Exam:     Physical Exam:  Visit Vitals  BP (!) 170/80   Pulse 64   Resp 20   Ht 5' 5\" (1.651 m)   Wt 186 lb (84.4 kg)   SpO2 98%   BMI 30.95 kg/m²     General appearance - alert, well appearing, and in no distress  Mental status - affect appropriate to mood  Eyes - sclera anicteric, moist mucous membranes  Neck - supple, no significant adenopathy  Chest - clear to auscultation, no wheezes, rales or rhonchi  Heart - normal rate, regular rhythm, normal S1, S2, ESM grade 2/6 aortic region. No rubs, clicks or gallops  Abdomen - soft, nontender, nondistended, no masses or organomegaly  Extremities - peripheral pulses normal, no pedal edema  Skin - normal coloration  no rashes    Medications:     Current Outpatient Medications   Medication Sig    docusate sodium (COLACE) 100 mg capsule Take 1 Cap by mouth two (2) times a day for 90 days.  acetaminophen (TYLENOL) 500 mg tablet Take 500 mg by mouth every six (6) hours as needed for Pain.  carvediloL (COREG) 12.5 mg tablet TAKE 1 TABLET BY MOUTH TWICE A DAY    lisinopril (PRINIVIL, ZESTRIL) 10 mg tablet TAKE 1 TABLET BY MOUTH EVERY DAY    colchicine 0.6 mg tablet Take 0.6 mg by mouth as needed for Gout.  glipiZIDE SR (GLUCOTROL XL) 2.5 mg CR tablet Take 2.5 mg by mouth two (2) times daily (with meals).  fluticasone (FLONASE) 50 mcg/actuation nasal spray 2 Sprays by Both Nostrils route as needed for Rhinitis.  aspirin 81 mg chewable tablet Take 1 Tab by mouth daily.  atorvastatin (LIPITOR) 10 mg tablet Take 10 mg by mouth nightly. No current facility-administered medications for this visit.        Diagnostic Data Review:       1/9/17: ECHO- LVEF 55%, mild hypokinesis of the basal-mid inferolateral wall(s), G1DD; Mild MR; AV leaflets exhibited sclerosis, mod AR; mild TR; mild ID    12/28/16: LEXISCAN- Normal Study LVEF 87%, no ischemic ST changes  CMRI 12/2/15    12/9/2015: CATH  --- S/p TRELL ( 2.25 x 23, 2.25 x 12 )-> mLAD, PTCA mLCX W/ residual 80-90% but improved flow in distal vess  11/18/15: STRESS NUC- EF 55%, inferolateral mod partially reversible defect w/ mid-mod carole-infarct ischemia   El  12/2/15: CARDIAC MRI- 1. Normal left ventricular cavity size with preserved left ventricular   systolic function. Moderate hypokinesis of the base to mid and distal   lateral wall. Thinning of the basal lateral wall. 3-D LVEF 62%. 2. Normal right ventricular size and systolic function. RVEF 55%. 3. Mild 1+ aortic regurgitation. 4. 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 There is only a thin   rim of viable myocardium subtending this infarct. The entire anterior wall,   anteroseptal wall, anteroapical wall, apex, inferior wall, inferoseptal wall   and inferolateral wall does not demonstrate any infarct and are completely   viable. Based on the viability study the LCx territory does not demonstrate   any significant viability and has a large infarct and will not recover upon   revascularization. The LAD territory and RCA territories demonstrate   significant viability and does not demonstrate any infarct. 5. Normal pleura and pericardium. There is no significant effusions. 6. Mildly dilated ascending aorta measuring 40 x 30 mm. 7. Possibly enlarged thyroid with possible thyroid nodule. Lab Review:     Lab Results   Component Value Date/Time    Cholesterol, Total 146 01/06/2016 07:49 AM     Lab Results   Component Value Date/Time    Creatinine 0.96 09/19/2020 04:23 AM     Lab Results   Component Value Date/Time    BUN 17 09/19/2020 04:23 AM     Lab Results   Component Value Date/Time    Potassium 3.6 09/19/2020 04:23 AM     No results found for: HBA1C, HGBE8, NFN4NZKW, WUB4UINY  Lab Results   Component Value Date/Time    HGB 12.5 09/19/2020 04:23 AM     Lab Results   Component Value Date/Time    PLATELET 936 91/36/7223 04:23 AM     No results for input(s): CPK, CKMB, TROIQ in the last 72 hours.     No lab exists for component: CKQMB, CPKMB             ___________________________________________________    Emilee Pacheco MD, [facility]

## 2020-10-02 ENCOUNTER — PATIENT OUTREACH (OUTPATIENT)
Dept: CASE MANAGEMENT | Age: 80
End: 2020-10-02

## 2020-10-08 VITALS
TEMPERATURE: 98.2 F | RESPIRATION RATE: 19 BRPM | WEIGHT: 189 LBS | OXYGEN SATURATION: 96 % | HEART RATE: 66 BPM | SYSTOLIC BLOOD PRESSURE: 147 MMHG | HEIGHT: 65 IN | BODY MASS INDEX: 31.49 KG/M2 | DIASTOLIC BLOOD PRESSURE: 67 MMHG

## 2021-01-26 RX ORDER — LISINOPRIL 20 MG/1
TABLET ORAL
Qty: 90 TAB | Refills: 2 | Status: SHIPPED | OUTPATIENT
Start: 2021-01-26 | End: 2022-01-05

## 2021-06-01 RX ORDER — CARVEDILOL 12.5 MG/1
TABLET ORAL
Qty: 180 TABLET | Refills: 3 | Status: SHIPPED | OUTPATIENT
Start: 2021-06-01 | End: 2022-07-20 | Stop reason: SDUPTHER

## 2021-07-02 ENCOUNTER — OFFICE VISIT (OUTPATIENT)
Dept: CARDIOLOGY CLINIC | Age: 81
End: 2021-07-02
Payer: MEDICARE

## 2021-07-02 VITALS
OXYGEN SATURATION: 96 % | HEIGHT: 65 IN | SYSTOLIC BLOOD PRESSURE: 155 MMHG | DIASTOLIC BLOOD PRESSURE: 80 MMHG | HEART RATE: 56 BPM | BODY MASS INDEX: 31.49 KG/M2 | WEIGHT: 189 LBS

## 2021-07-02 DIAGNOSIS — I10 ESSENTIAL HYPERTENSION: Primary | ICD-10-CM

## 2021-07-02 DIAGNOSIS — I71.9 AORTIC ANEURYSM WITHOUT RUPTURE, UNSPECIFIED PORTION OF AORTA (HCC): ICD-10-CM

## 2021-07-02 DIAGNOSIS — I25.10 CORONARY ARTERY DISEASE INVOLVING NATIVE CORONARY ARTERY OF NATIVE HEART WITHOUT ANGINA PECTORIS: ICD-10-CM

## 2021-07-02 PROCEDURE — G0463 HOSPITAL OUTPT CLINIC VISIT: HCPCS | Performed by: INTERNAL MEDICINE

## 2021-07-02 PROCEDURE — G8753 SYS BP > OR = 140: HCPCS | Performed by: INTERNAL MEDICINE

## 2021-07-02 PROCEDURE — 99214 OFFICE O/P EST MOD 30 MIN: CPT | Performed by: INTERNAL MEDICINE

## 2021-07-02 PROCEDURE — G8510 SCR DEP NEG, NO PLAN REQD: HCPCS | Performed by: INTERNAL MEDICINE

## 2021-07-02 PROCEDURE — G8536 NO DOC ELDER MAL SCRN: HCPCS | Performed by: INTERNAL MEDICINE

## 2021-07-02 PROCEDURE — 93010 ELECTROCARDIOGRAM REPORT: CPT | Performed by: INTERNAL MEDICINE

## 2021-07-02 PROCEDURE — G8417 CALC BMI ABV UP PARAM F/U: HCPCS | Performed by: INTERNAL MEDICINE

## 2021-07-02 PROCEDURE — 93005 ELECTROCARDIOGRAM TRACING: CPT | Performed by: INTERNAL MEDICINE

## 2021-07-02 PROCEDURE — G8428 CUR MEDS NOT DOCUMENT: HCPCS | Performed by: INTERNAL MEDICINE

## 2021-07-02 PROCEDURE — G8754 DIAS BP LESS 90: HCPCS | Performed by: INTERNAL MEDICINE

## 2021-07-02 PROCEDURE — 1101F PT FALLS ASSESS-DOCD LE1/YR: CPT | Performed by: INTERNAL MEDICINE

## 2021-07-02 NOTE — PROGRESS NOTES
Chief Complaint   Patient presents with    Hypertension    Coronary Artery Disease     Visit Vitals  BP (!) 155/80 (BP 1 Location: Right upper arm, BP Patient Position: Sitting)   Pulse (!) 56   Ht 5' 5\" (1.651 m)   Wt 189 lb (85.7 kg)   SpO2 96%   BMI 31.45 kg/m²     Chest pain denied   Palpations denied  SOB denied  Dizziness denied  Swelling in hands/feet denied   Recent hospital stays denied

## 2021-07-02 NOTE — PROGRESS NOTES
All orders entered per verbal orders of Dr. Afua Hunter     See Dr. Ada Van in 1 year with same day Echo for Aortic regurgitation. Cardiac MRI in 1 year for aortic aneurysm.

## 2021-07-02 NOTE — PATIENT INSTRUCTIONS
See Dr. Megan Spicer in 1 year with same day Echo for Aortic regurgitation. Cardiac MRI in 1 year for aortic aneurysm.

## 2021-07-02 NOTE — PROGRESS NOTES
Office Follow-up    NAME: Kassidy Pineda   :  1940  MRM:  [id number]    Date:  2021            Assessment:     Problem List  Date Reviewed: 2019        Codes Class Noted    Acute pancreatitis ICD-10-CM: K85.90  ICD-9-CM: 577.0  2020        HTN (hypertension) ICD-10-CM: I10  ICD-9-CM: 401.9  2020        DM (diabetes mellitus) ([facility] ICD-10-CM: E11.9  ICD-9-CM: 250.00  2020        Pancreatitis ICD-10-CM: K85.90  ICD-9-CM: 577.0  2020        CAD (coronary artery disease) ICD-10-CM: I25.10  ICD-9-CM: 414.00  2015        Chest pain ICD-10-CM: R07.9  ICD-9-CM: 786.50  2015        SOB (shortness of breath) ICD-10-CM: R06.02  ICD-9-CM: 786.05  2015                 Plan:     1. CAD/status post LAD TRELL stent ×2, POBA LCx: Continue ASA and statins. 2. Moderate aortic regurgitation: The aortic regurgitation is stable. Last echocardiogram was in . We will repeat echocardiogram in . 3. Hypertension: Blood pressure was elevated today despite increasing lisinopril 20 mg on previous visit. Continue Coreg. Monitor salt intake. May need additional medication possibly a diuretic. 4. Mild aortic aneurysm: On his recent cardiac MRI in  his ascending aorta was 41 mm. Continue to monitor with 3 yearly CMRI. We will repeat cardiac MRI in .  5. Sleep apnea: Continue CPAP  6. Hyperlipidemia: Continue statins. 7. Recent Cholecystectomy 2020.  8. Return to see me again in 1 year. Subjective:     Kassidy Pineda, a [de-identified]y.o. year-old who presents for followup. He has known history of CAD status post LAD TRELL stent ×2 and POBA of the left circumflex artery. He underwent cholecystectomy on 2020. Post cholecystectomy he had a normal postoperative course. He is returning today for follow-up. Denies any symptoms of chest pain, shortness of breath, lightheadedness or dizziness.     Blood pressure in the office has been elevated today.    EKG today demonstrated normal sinus rhythm with incomplete right bundle branch block with inferior infarct pattern with normal ST segment. Exam:     Physical Exam:  Visit Vitals  BP (!) 155/80 (BP 1 Location: Right upper arm, BP Patient Position: Sitting)   Pulse (!) 56   Ht 5' 5\" (1.651 m)   Wt 189 lb (85.7 kg)   SpO2 96%   BMI 31.45 kg/m²     General appearance - alert, well appearing, and in no distress  Mental status - affect appropriate to mood  Eyes - sclera anicteric, moist mucous membranes  Neck - supple, no significant adenopathy  Chest - clear to auscultation, no wheezes, rales or rhonchi  Heart - normal rate, regular rhythm, normal S1, S2, ESM grade 2/6 aortic region. No rubs, clicks or gallops  Abdomen - soft, nontender, nondistended, no masses or organomegaly  Extremities - peripheral pulses normal, no pedal edema  Skin - normal coloration  no rashes    Medications:     Current Outpatient Medications   Medication Sig    carvediloL (COREG) 12.5 mg tablet TAKE 1 TABLET BY MOUTH TWICE A DAY    lisinopriL (PRINIVIL, ZESTRIL) 20 mg tablet TAKE 1 TABLET BY MOUTH EVERY DAY    acetaminophen (TYLENOL) 500 mg tablet Take 500 mg by mouth every six (6) hours as needed for Pain.  colchicine 0.6 mg tablet Take 0.6 mg by mouth as needed for Gout.  glipiZIDE SR (GLUCOTROL XL) 2.5 mg CR tablet Take 5 mg by mouth two (2) times daily (with meals).  fluticasone (FLONASE) 50 mcg/actuation nasal spray 2 Sprays by Both Nostrils route as needed for Rhinitis.  aspirin 81 mg chewable tablet Take 1 Tab by mouth daily.  atorvastatin (LIPITOR) 10 mg tablet Take 10 mg by mouth nightly. No current facility-administered medications for this visit.       Diagnostic Data Review:       1/9/17: ECHO- LVEF 55%, mild hypokinesis of the basal-mid inferolateral wall(s), G1DD; Mild MR; AV leaflets exhibited sclerosis, mod AR; mild TR; mild GA    12/28/16: LEXISCAN- Normal Study LVEF 87%, no ischemic ST changes  CMRI 12/2/15    12/9/2015: CATH  --- S/p TRELL ( 2.25 x 23, 2.25 x 12 )-> mLAD, PTCA mLCX W/ residual 80-90% but improved flow in distal vess  11/18/15: STRESS NUC- EF 55%, inferolateral mod partially reversible defect w/ mid-mod carole-infarct ischemia   El  12/2/15: CARDIAC MRI- 1. Normal left ventricular cavity size with preserved left ventricular   systolic function. Moderate hypokinesis of the base to mid and distal   lateral wall. Thinning of the basal lateral wall. 3-D LVEF 62%. 2. Normal right ventricular size and systolic function. RVEF 55%. 3. Mild 1+ aortic regurgitation. 4. 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 There is only a thin   rim of viable myocardium subtending this infarct. The entire anterior wall,   anteroseptal wall, anteroapical wall, apex, inferior wall, inferoseptal wall   and inferolateral wall does not demonstrate any infarct and are completely   viable. Based on the viability study the LCx territory does not demonstrate   any significant viability and has a large infarct and will not recover upon   revascularization. The LAD territory and RCA territories demonstrate   significant viability and does not demonstrate any infarct. 5. Normal pleura and pericardium. There is no significant effusions. 6. Mildly dilated ascending aorta measuring 40 x 30 mm. 7. Possibly enlarged thyroid with possible thyroid nodule.       Lab Review:     Lab Results   Component Value Date/Time    Cholesterol, Total 146 01/06/2016 07:49 AM     Lab Results   Component Value Date/Time    Creatinine 0.96 09/19/2020 04:23 AM     Lab Results   Component Value Date/Time    BUN 17 09/19/2020 04:23 AM     Lab Results   Component Value Date/Time    Potassium 3.6 09/19/2020 04:23 AM     No results found for: HBA1C, VWR6MRJZ, AHE4QBAD  Lab Results   Component Value Date/Time    HGB 12.5 09/19/2020 04:23 AM     Lab Results Component Value Date/Time    PLATELET 512 64/55/4590 04:23 AM     No results for input(s): CPK, CKMB, TROIQ in the last 72 hours. No lab exists for component: LUCY, GEORGEB             ___________________________________________________    Telly Barley.  Shayna Ahumada MD, [facility]

## 2022-01-05 RX ORDER — LISINOPRIL 20 MG/1
TABLET ORAL
Qty: 90 TABLET | Refills: 2 | Status: SHIPPED | OUTPATIENT
Start: 2022-01-05 | End: 2022-07-20 | Stop reason: SDUPTHER

## 2022-01-05 NOTE — TELEPHONE ENCOUNTER
Refill per VO of Dr. Venice Cuenca  Last appt: 7/2/2021  Future Appointments   Date Time Provider Nelida Rogers   7/20/2022  1:00 PM GEREMIAS MICHAEL   7/20/2022  2:00 PM Zainab Kaur MD [facility]       Requested Prescriptions     Pending Prescriptions Disp Refills    lisinopriL (PRINIVIL, ZESTRIL) 20 mg tablet [Pharmacy Med Name: LISINOPRIL 20 MG TABLET] 90 Tablet 2     Sig: TAKE 1 TABLET BY MOUTH EVERY DAY

## 2022-03-18 PROBLEM — I10 HTN (HYPERTENSION): Status: ACTIVE | Noted: 2020-09-16

## 2022-03-19 PROBLEM — K85.90 PANCREATITIS: Status: ACTIVE | Noted: 2020-09-16

## 2022-03-19 PROBLEM — E11.9 DM (DIABETES MELLITUS) (HCC): Status: ACTIVE | Noted: 2020-09-16

## 2022-03-20 PROBLEM — K85.90 ACUTE PANCREATITIS: Status: ACTIVE | Noted: 2020-09-16

## 2022-05-25 ENCOUNTER — HOSPITAL ENCOUNTER (OUTPATIENT)
Dept: MRI IMAGING | Age: 82
Discharge: HOME | End: 2022-05-25
Attending: INTERNAL MEDICINE
Payer: MEDICARE

## 2022-05-25 VITALS
OXYGEN SATURATION: 99 % | DIASTOLIC BLOOD PRESSURE: 55 MMHG | SYSTOLIC BLOOD PRESSURE: 126 MMHG | HEART RATE: 57 BPM | RESPIRATION RATE: 12 BRPM

## 2022-05-25 DIAGNOSIS — I71.9 AORTIC ANEURYSM WITHOUT RUPTURE, UNSPECIFIED PORTION OF AORTA (HCC): ICD-10-CM

## 2022-05-25 LAB — CREAT BLD-MCNC: 0.9 MG/DL (ref 0.6–1.3)

## 2022-05-25 PROCEDURE — 82565 ASSAY OF CREATININE: CPT

## 2022-05-25 PROCEDURE — 74011250636 HC RX REV CODE- 250/636: Performed by: INTERNAL MEDICINE

## 2022-05-25 PROCEDURE — 75557 CARDIAC MRI FOR MORPH: CPT

## 2022-05-25 PROCEDURE — 99153 MOD SED SAME PHYS/QHP EA: CPT

## 2022-05-25 PROCEDURE — 77030021566

## 2022-05-25 PROCEDURE — 99152 MOD SED SAME PHYS/QHP 5/>YRS: CPT

## 2022-05-25 RX ORDER — MIDAZOLAM HYDROCHLORIDE 1 MG/ML
.5-5 INJECTION, SOLUTION INTRAMUSCULAR; INTRAVENOUS
Status: DISCONTINUED | OUTPATIENT
Start: 2022-05-25 | End: 2022-05-25

## 2022-05-25 RX ADMIN — MIDAZOLAM HYDROCHLORIDE 1 MG: 1 INJECTION, SOLUTION INTRAMUSCULAR; INTRAVENOUS at 09:00

## 2022-05-25 NOTE — PROGRESS NOTES
0745- Pt walked back to radiology holding for cardiac morphology MRI. Pt daughter Nessa Cano is in the waiting room and can be reached by phone. ([phone]). Pt is A&Ox 3 with no C/O pain. ASA 2, Airway 2, lungs diminished in the bases, S 1 and S 2 NSR with PVC.   0755- PIV #22 placed LFT AC, flushed with saline and positive blood return. I stat creat done. Creat. 0.9.   9656- Dr. Madai Lira obtained informed consent for cardiac morphology MRI with moderate sedation. 0900- Pt taken to CT and placed on monitor. Time out performed and sedation given. 1010- Pt tolerated MRI well. Total amount of sedation given was Versed 1 mg. Pt taken back to radiology holding. 1020- Discharge instructions given to pt. Pt was given opportunity to ask questions and clarification was provided. PIV #22 LFT AC was removed and gauze and coban over site. 1028- Pt discharged to the front [place] via wheelchair to the care of his daughter.

## 2022-05-25 NOTE — DISCHARGE INSTRUCTIONS
Patient Education        Learning About Sedation  What is sedation? Sedation is medicine that helps you feel relaxed and comfortable. It may be used with an injection to numb the area or other medicine to reduce pain. It is often used in procedures like a colonoscopy or a biopsy. It is also used in many minor surgeries. You may be awake and able to talk with your care team. Or you may fall asleep. You might remember little, if anything, of the procedure. What are the levels of sedation? There are three levels of sedation. You may start at one level and go to a deeper level during your procedure. Your doctor may give you oxygen to make sure your blood has plenty of oxygen while you're sleepy. Minimal.  In the lowest level of sedation, you are awake and relaxed and can do what the doctor asks you to do. You can understand questions and answer them. It can help you feel calm during your procedure. And it can be used if deeper levels of sedation are less safe for you. Minimal sedation is used for very minor procedures such as the removal of a small skin lesion or a vasectomy. Moderate. You are relaxed and feel drowsy. You may fall asleep, but someone can wake you easily. Afterward, you may not remember anything about your procedure. Moderate sedation is used for more uncomfortable procedures like small hernia repairs, some eye surgeries, or colonoscopies. Deep. You are asleep (unconscious) during your procedure. You will get oxygen and may need help with breathing. You probably won't remember anything. Deep sedation is used during procedures like hand or foot surgeries or tests that can make you very uncomfortable. How do you prepare? Your doctor will tell you what to expect when you have sedation. You will be asked to sign a consent form that says you understand the risks. You will get instructions to help you prepare for your procedure. You'll be told when to stop eating or drinking.  If you take medicine, you will be told what you can and can't take beforehand. Do not smoke for as long as possible, but at least 1 month, before your procedure. Smoking can increase your risk of problems under sedation and can delay your recovery. If you need help quitting, talk to your doctor about stop-smoking programs and medicines. These can increase your chances of quitting for good. Arrange for a friend or a family member to drive you home afterward. Don't plan to drive yourself. How is it done? Sedation is usually given in a vein in the arm (intravenously, or IV). It is often used with local or regional anesthesia. The local type numbs a small part of the body. The regional type blocks pain to a larger area of the body. With lighter levels of sedation, a doctor or nurse will watch your vital signs. This includes checking your breathing, blood pressure, and heart rate. With deeper levels, an anesthesia professional may be there during the procedure to help keep you safe. This is often called monitored anesthesia care (MAC). What are the risks? Serious problems are rare. They include breathing that slows or stops and an allergic reaction to the medicine. Some health issues may increase the risk of problems. These include:  · Smoking. · Sleep apnea. This happens during sleep when a blocked airway causes breathing problems. · Being overweight. What can you expect after sedation? After your procedure, you will have time to let the sedation wear off. You may feel some pain or discomfort from your procedure. If you have pain, don't be afraid to say so. Pain medicine works better if you take it before the pain gets bad. You may feel some of the side effects of sedation for a while. They include:  · Feeling tired or sleepy. · Nausea and vomiting. This is rare and does not last long. You may be given medicine to help you feel better. · A headache.   If you've had sedation, wait 24 hours before you drive or operate heavy machinery, make important decisions, go to work or school, or sign legal documents. It takes time for the medicine's effects to completely wear off. Current as of: October 6, 2021               Content Version: 13.2  © 0276-5988 [employer]. Care instructions adapted under license by [employer] (which disclaims liability or warranty for this information). If you have questions about a medical condition or this instruction, always ask your healthcare professional. [address] any warranty or liability for your use of this information.

## 2022-05-31 NOTE — PROGRESS NOTES
Pls notify>> no interval change in the sinus of Valsalva, ascending aorta, aortic arch and descending thoracic aorta.

## 2022-07-20 ENCOUNTER — OFFICE VISIT (OUTPATIENT)
Dept: CARDIOLOGY CLINIC | Age: 82
End: 2022-07-20
Payer: MEDICARE

## 2022-07-20 ENCOUNTER — ANCILLARY PROCEDURE (OUTPATIENT)
Dept: CARDIOLOGY CLINIC | Age: 82
End: 2022-07-20
Payer: MEDICARE

## 2022-07-20 VITALS
HEART RATE: 60 BPM | SYSTOLIC BLOOD PRESSURE: 130 MMHG | DIASTOLIC BLOOD PRESSURE: 80 MMHG | WEIGHT: 186 LBS | HEIGHT: 65 IN | OXYGEN SATURATION: 97 % | BODY MASS INDEX: 30.99 KG/M2

## 2022-07-20 VITALS
WEIGHT: 186 LBS | SYSTOLIC BLOOD PRESSURE: 130 MMHG | HEIGHT: 65 IN | BODY MASS INDEX: 30.99 KG/M2 | DIASTOLIC BLOOD PRESSURE: 80 MMHG

## 2022-07-20 DIAGNOSIS — I25.10 CORONARY ARTERY DISEASE INVOLVING NATIVE CORONARY ARTERY OF NATIVE HEART, UNSPECIFIED WHETHER ANGINA PRESENT: ICD-10-CM

## 2022-07-20 DIAGNOSIS — I10 PRIMARY HYPERTENSION: Primary | ICD-10-CM

## 2022-07-20 DIAGNOSIS — I25.10 CORONARY ARTERY DISEASE INVOLVING NATIVE CORONARY ARTERY OF NATIVE HEART WITHOUT ANGINA PECTORIS: ICD-10-CM

## 2022-07-20 DIAGNOSIS — I71.9 AORTIC ANEURYSM WITHOUT RUPTURE, UNSPECIFIED PORTION OF AORTA (HCC): ICD-10-CM

## 2022-07-20 DIAGNOSIS — I35.1 AORTIC VALVE INSUFFICIENCY, ETIOLOGY OF CARDIAC VALVE DISEASE UNSPECIFIED: ICD-10-CM

## 2022-07-20 DIAGNOSIS — I10 ESSENTIAL HYPERTENSION: ICD-10-CM

## 2022-07-20 LAB
ECHO AO ASC DIAM: 4.1 CM
ECHO AO ASCENDING AORTA INDEX: 2.14 CM/M2
ECHO AO ROOT DIAM: 3.7 CM
ECHO AO ROOT INDEX: 1.93 CM/M2
ECHO AV AREA PEAK VELOCITY: 1.8 CM2
ECHO AV AREA VTI: 1.9 CM2
ECHO AV AREA/BSA PEAK VELOCITY: 0.9 CM2/M2
ECHO AV AREA/BSA VTI: 1 CM2/M2
ECHO AV MEAN GRADIENT: 6 MMHG
ECHO AV MEAN VELOCITY: 1.1 M/S
ECHO AV PEAK GRADIENT: 11 MMHG
ECHO AV PEAK VELOCITY: 1.7 M/S
ECHO AV VELOCITY RATIO: 0.59
ECHO AV VTI: 38.3 CM
ECHO LA DIAMETER INDEX: 2.03 CM/M2
ECHO LA DIAMETER: 3.9 CM
ECHO LA TO AORTIC ROOT RATIO: 1.05
ECHO LA VOL 2C: 66 ML (ref 18–58)
ECHO LA VOL 4C: 56 ML (ref 18–58)
ECHO LA VOL BP: 62 ML (ref 18–58)
ECHO LA VOL/BSA BIPLANE: 32 ML/M2 (ref 16–34)
ECHO LA VOLUME AREA LENGTH: 67 ML
ECHO LA VOLUME INDEX A2C: 34 ML/M2 (ref 16–34)
ECHO LA VOLUME INDEX A4C: 29 ML/M2 (ref 16–34)
ECHO LA VOLUME INDEX AREA LENGTH: 35 ML/M2 (ref 16–34)
ECHO LV E' LATERAL VELOCITY: 8 CM/S
ECHO LV E' SEPTAL VELOCITY: 6 CM/S
ECHO LV EDV A2C: 102 ML
ECHO LV EDV A4C: 144 ML
ECHO LV EDV BP: 125 ML (ref 67–155)
ECHO LV EDV INDEX A4C: 75 ML/M2
ECHO LV EDV INDEX BP: 65 ML/M2
ECHO LV EDV NDEX A2C: 53 ML/M2
ECHO LV EJECTION FRACTION A2C: 52 %
ECHO LV EJECTION FRACTION A4C: 58 %
ECHO LV EJECTION FRACTION BIPLANE: 57 % (ref 55–100)
ECHO LV ESV A2C: 49 ML
ECHO LV ESV A4C: 60 ML
ECHO LV ESV BP: 54 ML (ref 22–58)
ECHO LV ESV INDEX A2C: 26 ML/M2
ECHO LV ESV INDEX A4C: 31 ML/M2
ECHO LV ESV INDEX BP: 28 ML/M2
ECHO LV FRACTIONAL SHORTENING: 30 % (ref 28–44)
ECHO LV INTERNAL DIMENSION DIASTOLE INDEX: 2.6 CM/M2
ECHO LV INTERNAL DIMENSION DIASTOLIC: 5 CM (ref 4.2–5.9)
ECHO LV INTERNAL DIMENSION SYSTOLIC INDEX: 1.82 CM/M2
ECHO LV INTERNAL DIMENSION SYSTOLIC: 3.5 CM
ECHO LV IVSD: 1.1 CM (ref 0.6–1)
ECHO LV MASS 2D: 182 G (ref 88–224)
ECHO LV MASS INDEX 2D: 94.8 G/M2 (ref 49–115)
ECHO LV POSTERIOR WALL DIASTOLIC: 0.9 CM (ref 0.6–1)
ECHO LV RELATIVE WALL THICKNESS RATIO: 0.36
ECHO LVOT AREA: 3.1 CM2
ECHO LVOT AV VTI INDEX: 0.58
ECHO LVOT DIAM: 2 CM
ECHO LVOT MEAN GRADIENT: 2 MMHG
ECHO LVOT PEAK GRADIENT: 4 MMHG
ECHO LVOT PEAK VELOCITY: 1 M/S
ECHO LVOT STROKE VOLUME INDEX: 36.5 ML/M2
ECHO LVOT SV: 70 ML
ECHO LVOT VTI: 22.3 CM
ECHO MV A VELOCITY: 1.13 M/S
ECHO MV AREA PHT: 2.9 CM2
ECHO MV E DECELERATION TIME (DT): 257.3 MS
ECHO MV E VELOCITY: 0.79 M/S
ECHO MV E/A RATIO: 0.7
ECHO MV E/E' LATERAL: 9.88
ECHO MV E/E' RATIO (AVERAGED): 11.52
ECHO MV E/E' SEPTAL: 13.17
ECHO MV PRESSURE HALF TIME (PHT): 74.6 MS
ECHO RV FREE WALL PEAK S': 14 CM/S
ECHO RV INTERNAL DIMENSION: 3.6 CM
ECHO RV TAPSE: 2.2 CM (ref 1.7–?)
ECHO TV REGURGITANT MAX VELOCITY: 2.42 M/S
ECHO TV REGURGITANT PEAK GRADIENT: 23 MMHG

## 2022-07-20 PROCEDURE — G0463 HOSPITAL OUTPT CLINIC VISIT: HCPCS | Performed by: INTERNAL MEDICINE

## 2022-07-20 PROCEDURE — 1101F PT FALLS ASSESS-DOCD LE1/YR: CPT | Performed by: INTERNAL MEDICINE

## 2022-07-20 PROCEDURE — G8417 CALC BMI ABV UP PARAM F/U: HCPCS | Performed by: INTERNAL MEDICINE

## 2022-07-20 PROCEDURE — G8428 CUR MEDS NOT DOCUMENT: HCPCS | Performed by: INTERNAL MEDICINE

## 2022-07-20 PROCEDURE — 93306 TTE W/DOPPLER COMPLETE: CPT | Performed by: INTERNAL MEDICINE

## 2022-07-20 PROCEDURE — G8432 DEP SCR NOT DOC, RNG: HCPCS | Performed by: INTERNAL MEDICINE

## 2022-07-20 PROCEDURE — G8536 NO DOC ELDER MAL SCRN: HCPCS | Performed by: INTERNAL MEDICINE

## 2022-07-20 PROCEDURE — G8752 SYS BP LESS 140: HCPCS | Performed by: INTERNAL MEDICINE

## 2022-07-20 PROCEDURE — 99214 OFFICE O/P EST MOD 30 MIN: CPT | Performed by: INTERNAL MEDICINE

## 2022-07-20 PROCEDURE — 1123F ACP DISCUSS/DSCN MKR DOCD: CPT | Performed by: INTERNAL MEDICINE

## 2022-07-20 PROCEDURE — G8754 DIAS BP LESS 90: HCPCS | Performed by: INTERNAL MEDICINE

## 2022-07-20 RX ORDER — LISINOPRIL 20 MG/1
20 TABLET ORAL DAILY
Qty: 90 TABLET | Refills: 4 | Status: SHIPPED | OUTPATIENT
Start: 2022-07-20

## 2022-07-20 RX ORDER — CARVEDILOL 12.5 MG/1
12.5 TABLET ORAL 2 TIMES DAILY
Qty: 180 TABLET | Refills: 4 | Status: SHIPPED | OUTPATIENT
Start: 2022-07-20 | End: 2022-07-20

## 2022-07-20 RX ORDER — LISINOPRIL 20 MG/1
20 TABLET ORAL DAILY
Qty: 90 TABLET | Refills: 4 | Status: SHIPPED | OUTPATIENT
Start: 2022-07-20 | End: 2022-07-20

## 2022-07-20 RX ORDER — CARVEDILOL 12.5 MG/1
12.5 TABLET ORAL 2 TIMES DAILY
Qty: 180 TABLET | Refills: 4 | Status: SHIPPED | OUTPATIENT
Start: 2022-07-20

## 2022-07-20 NOTE — PROGRESS NOTES
All orders entered per verbal orders of Dr. Jossie Nassar. MD Natasha    Refill Coreg and Lisinopril. See Dr. Britney Charles in 1 year with same day Echo for Aortic Regurgitation    Refill per VO of Dr. Zavala Comfort:    Last appt: 7/2/2021    No future appointments. Requested Prescriptions     Pending Prescriptions Disp Refills    carvediloL (COREG) 12.5 mg tablet 180 Tablet 4     Sig: Take 1 Tablet by mouth two (2) times a day. lisinopriL (PRINIVIL, ZESTRIL) 20 mg tablet 90 Tablet 4     Sig: Take 1 Tablet by mouth in the morning.      Pt requesting new pharmacy

## 2022-07-20 NOTE — PROGRESS NOTES
Office Follow-up    NAME: Dimitrios Brumfield   :  1940  MRM:  [id number]    Date:  2022            Assessment:     Problem List  Date Reviewed: 2019            Codes Class Noted    Acute pancreatitis ICD-10-CM: K85.90  ICD-9-CM: 577.0  2020        HTN (hypertension) ICD-10-CM: I10  ICD-9-CM: 401.9  2020        DM (diabetes mellitus) ([facility] ICD-10-CM: E11.9  ICD-9-CM: 250.00  2020        Pancreatitis ICD-10-CM: K85.90  ICD-9-CM: 577.0  2020        CAD (coronary artery disease) ICD-10-CM: I25.10  ICD-9-CM: 414.00  2015        Chest pain ICD-10-CM: R07.9  ICD-9-CM: 786.50  2015        SOB (shortness of breath) ICD-10-CM: R06.02  ICD-9-CM: 786.05  2015              Plan:     CAD/status post LAD TRELL stent ×2, POBA LCx: Continue ASA and statins. Moderate aortic regurgitation: The aortic regurgitation is stable. Last echocardiogram was in . MRI in  show mild AR. Hypertension: Blood pressure is now controlled. Continue Lisinopril, and Coreg. Monitor salt intake. Mild aortic aneurysm: CMR May 2022 show stable ascending aorta 41 mm. Continue to monitor with 3 yearly CMRI. We will repeat cardiac MRI in . Sleep apnea: Continue CPAP  Hyperlipidemia: Continue statins. Recent Cholecystectomy 2020. Return to see me again in 1 year. Subjective:     Dimitrios Brumfield, a 81y.o. year-old who presents for followup. He has known history of CAD status post LAD TRELL stent ×2 and POBA of the left circumflex artery. He underwent cholecystectomy on 2020. Denies any symptoms of chest pain, shortness of breath, lightheadedness or dizziness.     Exam:     Physical Exam:  Visit Vitals  /80 (BP 1 Location: Left upper arm, BP Patient Position: Sitting)   Pulse 60   Ht 5' 5\" (1.651 m)   Wt 186 lb (84.4 kg)   SpO2 97%   BMI 30.95 kg/m²     General appearance - alert, well appearing, and in no distress  Mental status - affect appropriate to mood  Eyes - sclera anicteric, moist mucous membranes  Neck - supple, no significant adenopathy  Chest - clear to auscultation, no wheezes, rales or rhonchi  Heart - normal rate, regular rhythm, normal S1, S2, ESM grade 2/6 aortic region. No rubs, clicks or gallops  Abdomen - soft, nontender, nondistended, no masses or organomegaly  Extremities - peripheral pulses normal, no pedal edema  Skin - normal coloration  no rashes    Medications:     Current Outpatient Medications   Medication Sig    lisinopriL (PRINIVIL, ZESTRIL) 20 mg tablet TAKE 1 TABLET BY MOUTH EVERY DAY    carvediloL (COREG) 12.5 mg tablet TAKE 1 TABLET BY MOUTH TWICE A DAY    acetaminophen (TYLENOL) 500 mg tablet Take 500 mg by mouth every six (6) hours as needed for Pain. colchicine 0.6 mg tablet Take 0.6 mg by mouth as needed for Gout.    glipiZIDE SR (GLUCOTROL XL) 2.5 mg CR tablet Take 5 mg by mouth two (2) times daily (with meals). 2 tab in the morning and 1 at night    fluticasone (FLONASE) 50 mcg/actuation nasal spray 2 Sprays by Both Nostrils route as needed for Rhinitis. aspirin 81 mg chewable tablet Take 1 Tab by mouth daily. atorvastatin (LIPITOR) 10 mg tablet Take 10 mg by mouth nightly. No current facility-administered medications for this visit. Diagnostic Data Review:       1/9/17: ECHO- LVEF 55%, mild hypokinesis of the basal-mid inferolateral wall(s), G1DD; Mild MR; AV leaflets exhibited sclerosis, mod AR; mild TR; mild VA    12/28/16: LEXISCAN- Normal Study LVEF 87%, no ischemic ST changes  CMRI 12/2/15    12/9/2015: CATH  --- S/p TRELL ( 2.25 x 23, 2.25 x 12 )-> mLAD, PTCA mLCX W/ residual 80-90% but improved flow in distal vess  11/18/15: STRESS NUC- EF 55%, inferolateral mod partially reversible defect w/ mid-mod carole-infarct ischemia   El  12/2/15: CARDIAC MRI- 1. Normal left ventricular cavity size with preserved left ventricular   systolic function.  Moderate hypokinesis of the base to mid and distal   lateral wall. Thinning of the basal lateral wall. 3-D LVEF 62%. 2. Normal right ventricular size and systolic function. RVEF 55%. 3. Mild 1+ aortic regurgitation. 4. 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 There is only a thin   rim of viable myocardium subtending this infarct. The entire anterior wall,   anteroseptal wall, anteroapical wall, apex, inferior wall, inferoseptal wall   and inferolateral wall does not demonstrate any infarct and are completely   viable. Based on the viability study the LCx territory does not demonstrate   any significant viability and has a large infarct and will not recover upon   revascularization. The LAD territory and RCA territories demonstrate   significant viability and does not demonstrate any infarct. 5. Normal pleura and pericardium. There is no significant effusions. 6. Mildly dilated ascending aorta measuring 40 x 30 mm. 7. Possibly enlarged thyroid with possible thyroid nodule. Lab Review:     Lab Results   Component Value Date/Time    Cholesterol, Total 146 01/06/2016 07:49 AM     Lab Results   Component Value Date/Time    Creatinine (POC) 0.90 05/25/2022 08:22 AM    Creatinine 0.96 09/19/2020 04:23 AM     Lab Results   Component Value Date/Time    BUN 17 09/19/2020 04:23 AM     Lab Results   Component Value Date/Time    Potassium 3.6 09/19/2020 04:23 AM     No results found for: HBA1C, HXL0JCDT, RPS7XFTA  Lab Results   Component Value Date/Time    HGB 12.5 09/19/2020 04:23 AM     Lab Results   Component Value Date/Time    PLATELET 333 24/67/3322 04:23 AM     No results for input(s): CPK, CKMB, TROIQ in the last 72 hours. No lab exists for component: CKQMB, CPKMB             ___________________________________________________    Grace Carrizales.  Dariusz Cross MD, [address] [facility]

## 2022-07-20 NOTE — PATIENT INSTRUCTIONS
Refill Coreg and Lisinopril. See Dr. Elva Chen in 1 year with same day Echo for Aortic Regurgitation.

## 2022-07-20 NOTE — PROGRESS NOTES
Chief Complaint   Patient presents with    Follow-up     1 yr fu with ECHO with Teresa Woody     Coronary Artery Disease    Hypertension    Shortness of Breath     Visit Vitals  /80 (BP 1 Location: Left upper arm, BP Patient Position: Sitting)   Pulse 60   Ht 5' 5\" (1.651 m)   Wt 186 lb (84.4 kg)   SpO2 97%   BMI 30.95 kg/m²     Chest pain denied   SOB denied   Palpitations denied   Swelling in hands/feet denied   Dizziness denied   Recent hospital stays denied   Refills denied     Dr. Sukhjinder Leslie   [facility]   Contact: [phone]  PCP changed practice.

## 2022-11-16 ENCOUNTER — TELEPHONE (OUTPATIENT)
Dept: CARDIOLOGY CLINIC | Age: 82
End: 2022-11-16

## 2022-11-16 NOTE — TELEPHONE ENCOUNTER
Manda Vargas from [address] [place] [address] is calling to notify us that patients Hemoglobin A1C level was 11.3 and wanted to start him on Synjardy 28DO + Trulicity 7.35HE (injectablen). Please Advise.      Phone # - [phone]    Fax # - [phone]

## 2022-11-18 ENCOUNTER — DOCUMENTATION ONLY (OUTPATIENT)
Dept: CARDIOLOGY CLINIC | Age: 82
End: 2022-11-18

## 2022-11-18 RX ORDER — DULAGLUTIDE 0.75 MG/.5ML
0.75 INJECTION, SOLUTION SUBCUTANEOUS
COMMUNITY

## 2022-12-06 ENCOUNTER — TELEPHONE (OUTPATIENT)
Dept: CARDIOLOGY CLINIC | Age: 82
End: 2022-12-06

## 2022-12-06 NOTE — TELEPHONE ENCOUNTER
Nurse called to check in on the status of previous message from [facility], see below   \"Mazin from [address] [facility] is calling to notify us that patients Hemoglobin A1C level was 11.3 and wanted to start him on Synjardy 26HX + Trulicity 0.38BR (injectablen). Please Advise.      Phone # - [phone]     Fax # - [phone]

## 2022-12-06 NOTE — TELEPHONE ENCOUNTER
Spoke with  [facility] @ [facility] , identified the pt with name and . Informed her that it's fine from a cardiac stand point for the pt to be started on Synjardy 10 mg and trulicity 9.68 mg.

## 2023-02-04 LAB — HBA1C MFR BLD HPLC: 7.3 %

## 2023-07-21 ENCOUNTER — OFFICE VISIT (OUTPATIENT)
Age: 83
End: 2023-07-21
Payer: MEDICARE

## 2023-07-21 ENCOUNTER — ANCILLARY PROCEDURE (OUTPATIENT)
Age: 83
End: 2023-07-21
Payer: MEDICARE

## 2023-07-21 VITALS
HEART RATE: 55 BPM | BODY MASS INDEX: 28.49 KG/M2 | DIASTOLIC BLOOD PRESSURE: 62 MMHG | WEIGHT: 171 LBS | SYSTOLIC BLOOD PRESSURE: 136 MMHG | HEIGHT: 65 IN | OXYGEN SATURATION: 98 %

## 2023-07-21 VITALS
DIASTOLIC BLOOD PRESSURE: 62 MMHG | WEIGHT: 171.6 LBS | BODY MASS INDEX: 28.59 KG/M2 | HEART RATE: 55 BPM | HEIGHT: 65 IN | SYSTOLIC BLOOD PRESSURE: 136 MMHG

## 2023-07-21 DIAGNOSIS — I35.1 AORTIC INSUFFICIENCY: ICD-10-CM

## 2023-07-21 DIAGNOSIS — I71.21 ANEURYSM OF ASCENDING AORTA WITHOUT RUPTURE (HCC): Primary | ICD-10-CM

## 2023-07-21 DIAGNOSIS — I35.1 NONRHEUMATIC AORTIC VALVE INSUFFICIENCY: ICD-10-CM

## 2023-07-21 DIAGNOSIS — I10 ESSENTIAL (PRIMARY) HYPERTENSION: ICD-10-CM

## 2023-07-21 LAB
ECHO AO ASC DIAM: 4.2 CM
ECHO AO ASCENDING AORTA INDEX: 2.27 CM/M2
ECHO AO ROOT DIAM: 3.6 CM
ECHO AO ROOT INDEX: 1.95 CM/M2
ECHO AV AREA PEAK VELOCITY: 2.5 CM2
ECHO AV AREA VTI: 3.1 CM2
ECHO AV AREA/BSA PEAK VELOCITY: 1.4 CM2/M2
ECHO AV AREA/BSA VTI: 1.7 CM2/M2
ECHO AV MEAN GRADIENT: 6 MMHG
ECHO AV MEAN VELOCITY: 1.2 M/S
ECHO AV PEAK GRADIENT: 12 MMHG
ECHO AV PEAK VELOCITY: 1.7 M/S
ECHO AV VELOCITY RATIO: 0.65
ECHO AV VENA CONTRACTA / LVOT DIAMETER: 2.27
ECHO AV VENA CONTRACTA AREA / LVOT AREA: 5.17
ECHO AV VENA CONTRACTA AREA: 61.6 CM2
ECHO AV VENA CONTRACTA: 5 CM
ECHO AV VTI: 33.8 CM
ECHO BSA: 1.89 M2
ECHO EST RA PRESSURE: 3 MMHG
ECHO LA DIAMETER INDEX: 2.27 CM/M2
ECHO LA DIAMETER: 4.2 CM
ECHO LA TO AORTIC ROOT RATIO: 1.17
ECHO LA VOL 2C: 53 ML (ref 18–58)
ECHO LA VOL 4C: 56 ML (ref 18–58)
ECHO LA VOLUME AREA LENGTH: 59 ML
ECHO LA VOLUME INDEX A2C: 29 ML/M2 (ref 16–34)
ECHO LA VOLUME INDEX A4C: 30 ML/M2 (ref 16–34)
ECHO LA VOLUME INDEX AREA LENGTH: 32 ML/M2 (ref 16–34)
ECHO LV E' LATERAL VELOCITY: 7 CM/S
ECHO LV E' SEPTAL VELOCITY: 7 CM/S
ECHO LV EDV A2C: 120 ML
ECHO LV EDV A4C: 145 ML
ECHO LV EDV BP: 132 ML (ref 67–155)
ECHO LV EDV INDEX A4C: 78 ML/M2
ECHO LV EDV INDEX BP: 71 ML/M2
ECHO LV EDV NDEX A2C: 65 ML/M2
ECHO LV EJECTION FRACTION A2C: 57 %
ECHO LV EJECTION FRACTION A4C: 54 %
ECHO LV EJECTION FRACTION BIPLANE: 55 % (ref 55–100)
ECHO LV ESV A2C: 51 ML
ECHO LV ESV A4C: 67 ML
ECHO LV ESV BP: 59 ML (ref 22–58)
ECHO LV ESV INDEX A2C: 28 ML/M2
ECHO LV ESV INDEX A4C: 36 ML/M2
ECHO LV ESV INDEX BP: 32 ML/M2
ECHO LV FRACTIONAL SHORTENING: 29 % (ref 28–44)
ECHO LV INTERNAL DIMENSION DIASTOLE INDEX: 2.65 CM/M2
ECHO LV INTERNAL DIMENSION DIASTOLIC: 4.9 CM (ref 4.2–5.9)
ECHO LV INTERNAL DIMENSION SYSTOLIC INDEX: 1.89 CM/M2
ECHO LV INTERNAL DIMENSION SYSTOLIC: 3.5 CM
ECHO LV IVSD: 0.8 CM (ref 0.6–1)
ECHO LV MASS 2D: 131.2 G (ref 88–224)
ECHO LV MASS INDEX 2D: 70.9 G/M2 (ref 49–115)
ECHO LV POSTERIOR WALL DIASTOLIC: 0.8 CM (ref 0.6–1)
ECHO LV RELATIVE WALL THICKNESS RATIO: 0.33
ECHO LVOT AREA: 3.8 CM2
ECHO LVOT AV VTI INDEX: 0.78
ECHO LVOT DIAM: 2.2 CM
ECHO LVOT MEAN GRADIENT: 3 MMHG
ECHO LVOT PEAK GRADIENT: 5 MMHG
ECHO LVOT PEAK VELOCITY: 1.1 M/S
ECHO LVOT STROKE VOLUME INDEX: 54 ML/M2
ECHO LVOT SV: 99.9 ML
ECHO LVOT VTI: 26.3 CM
ECHO MV A VELOCITY: 0.84 M/S
ECHO MV AREA PHT: 2.8 CM2
ECHO MV E DECELERATION TIME (DT): 270.3 MS
ECHO MV E VELOCITY: 0.7 M/S
ECHO MV E/A RATIO: 0.83
ECHO MV E/E' LATERAL: 10
ECHO MV E/E' RATIO (AVERAGED): 10
ECHO MV E/E' SEPTAL: 10
ECHO MV PRESSURE HALF TIME (PHT): 78.4 MS
ECHO RA AREA 4C: 18.5 CM2
ECHO RA END SYSTOLIC VOLUME APICAL 4 CHAMBER INDEX BSA: 25 ML/M2
ECHO RA VOLUME: 46 ML
ECHO RIGHT VENTRICULAR SYSTOLIC PRESSURE (RVSP): 28 MMHG
ECHO RV INTERNAL DIMENSION: 4.1 CM
ECHO RV TAPSE: 2.5 CM (ref 1.7–?)
ECHO TV REGURGITANT MAX VELOCITY: 2.5 M/S
ECHO TV REGURGITANT PEAK GRADIENT: 25 MMHG

## 2023-07-21 PROCEDURE — 1036F TOBACCO NON-USER: CPT | Performed by: INTERNAL MEDICINE

## 2023-07-21 PROCEDURE — 3078F DIAST BP <80 MM HG: CPT | Performed by: INTERNAL MEDICINE

## 2023-07-21 PROCEDURE — 99214 OFFICE O/P EST MOD 30 MIN: CPT | Performed by: INTERNAL MEDICINE

## 2023-07-21 PROCEDURE — G8419 CALC BMI OUT NRM PARAM NOF/U: HCPCS | Performed by: INTERNAL MEDICINE

## 2023-07-21 PROCEDURE — 3075F SYST BP GE 130 - 139MM HG: CPT | Performed by: INTERNAL MEDICINE

## 2023-07-21 PROCEDURE — 1123F ACP DISCUSS/DSCN MKR DOCD: CPT | Performed by: INTERNAL MEDICINE

## 2023-07-21 PROCEDURE — 93306 TTE W/DOPPLER COMPLETE: CPT

## 2023-07-21 PROCEDURE — G8427 DOCREV CUR MEDS BY ELIG CLIN: HCPCS | Performed by: INTERNAL MEDICINE

## 2023-07-21 RX ORDER — EMPAGLIFLOZIN AND METFORMIN HYDROCHLORIDE 12.5; 1 MG/1; MG/1
TABLET ORAL
COMMUNITY

## 2023-07-21 NOTE — PROGRESS NOTES
Chief Complaint   Patient presents with    Other     ECHO TODAY WITH ARIELLE ESTRADA    Hypertension     Vitals:    07/21/23 1337   BP: 136/62   Site: Left Upper Arm   Position: Sitting   Pulse: 55      /62 (Site: Left Upper Arm, Position: Sitting)   Pulse 55      Chest pain             NO  SOB                       NO  Swelling                 NO  Dizziness               NO  Recent hospital     NO  Refills                    NO         Covid vaccination  YES  Covid                     YES

## 2023-08-08 ENCOUNTER — TELEPHONE (OUTPATIENT)
Age: 83
End: 2023-08-08

## 2023-08-08 RX ORDER — CARVEDILOL 12.5 MG/1
12.5 TABLET ORAL 2 TIMES DAILY
Qty: 180 TABLET | Refills: 3 | Status: SHIPPED | OUTPATIENT
Start: 2023-08-08

## 2023-08-08 RX ORDER — LISINOPRIL 20 MG/1
20 TABLET ORAL DAILY
Qty: 90 TABLET | Refills: 3 | Status: SHIPPED | OUTPATIENT
Start: 2023-08-08

## 2023-08-08 NOTE — TELEPHONE ENCOUNTER
Refill per VO of Dr. Blank King:    2023    Future Appointments   Date Time Provider [address] [place] [address]   2024  1:20 PM Mir Mccrary MD [facility]       Requested Prescriptions     Pending Prescriptions Disp Refills    lisinopril (PRINIVIL;ZESTRIL) 20 MG tablet 90 tablet 3     Sig: Take 1 tablet by mouth daily    carvedilol (COREG) 12.5 MG tablet 180 tablet 3     Sig: Take 1 tablet by mouth 2 times daily

## 2023-08-08 NOTE — TELEPHONE ENCOUNTER
Spoke to pt in office to inform him unfortunately per [facility] medication can not be refilled until pt is seen in person. Pt stated Dr. Roberth Mattson had text him and stated he needed to schedule a new pt appt. Offer pt twice next available 12/6/23. Pt declined and said he will just text Dr. Roberth Mattson his self. -TM 8/8/23

## 2023-08-08 NOTE — TELEPHONE ENCOUNTER
Patient came into the office requesting a refill on Lisinopril and Carvedilol. Please send to [facility] on [facility] in [place]. Patient can be reached at [phone].

## 2023-08-14 ENCOUNTER — OFFICE VISIT (OUTPATIENT)
Age: 83
End: 2023-08-14
Payer: MEDICARE

## 2023-08-14 VITALS
HEIGHT: 65 IN | BODY MASS INDEX: 28.49 KG/M2 | TEMPERATURE: 97.3 F | HEART RATE: 72 BPM | WEIGHT: 171 LBS | SYSTOLIC BLOOD PRESSURE: 132 MMHG | OXYGEN SATURATION: 98 % | DIASTOLIC BLOOD PRESSURE: 68 MMHG

## 2023-08-14 DIAGNOSIS — I10 HYPERTENSION, UNSPECIFIED TYPE: Primary | ICD-10-CM

## 2023-08-14 DIAGNOSIS — Z76.89 ENCOUNTER TO ESTABLISH CARE: ICD-10-CM

## 2023-08-14 DIAGNOSIS — Z99.89 OSA ON CPAP: ICD-10-CM

## 2023-08-14 DIAGNOSIS — G47.33 OSA ON CPAP: ICD-10-CM

## 2023-08-14 DIAGNOSIS — I25.10 CORONARY ARTERY DISEASE INVOLVING NATIVE CORONARY ARTERY OF NATIVE HEART WITHOUT ANGINA PECTORIS: ICD-10-CM

## 2023-08-14 DIAGNOSIS — M10.00 IDIOPATHIC GOUT, UNSPECIFIED CHRONICITY, UNSPECIFIED SITE: ICD-10-CM

## 2023-08-14 DIAGNOSIS — K21.9 GASTROESOPHAGEAL REFLUX DISEASE WITHOUT ESOPHAGITIS: ICD-10-CM

## 2023-08-14 DIAGNOSIS — E11.9 TYPE 2 DIABETES MELLITUS WITHOUT COMPLICATION, WITHOUT LONG-TERM CURRENT USE OF INSULIN (HCC): ICD-10-CM

## 2023-08-14 DIAGNOSIS — E78.2 MIXED HYPERLIPIDEMIA: ICD-10-CM

## 2023-08-14 LAB
CREAT UR-MCNC: 111 MG/DL
EST. AVERAGE GLUCOSE BLD GHB EST-MCNC: 131 MG/DL
HBA1C MFR BLD: 6.2 % (ref 4–5.6)
MICROALBUMIN UR-MCNC: 2.06 MG/DL
MICROALBUMIN/CREAT UR-RTO: 19 MG/G (ref 0–30)

## 2023-08-14 PROCEDURE — 99204 OFFICE O/P NEW MOD 45 MIN: CPT | Performed by: INTERNAL MEDICINE

## 2023-08-14 SDOH — ECONOMIC STABILITY: FOOD INSECURITY: WITHIN THE PAST 12 MONTHS, THE FOOD YOU BOUGHT JUST DIDN'T LAST AND YOU DIDN'T HAVE MONEY TO GET MORE.: NEVER TRUE

## 2023-08-14 SDOH — ECONOMIC STABILITY: INCOME INSECURITY: HOW HARD IS IT FOR YOU TO PAY FOR THE VERY BASICS LIKE FOOD, HOUSING, MEDICAL CARE, AND HEATING?: NOT HARD AT ALL

## 2023-08-14 SDOH — ECONOMIC STABILITY: HOUSING INSECURITY
IN THE LAST 12 MONTHS, WAS THERE A TIME WHEN YOU DID NOT HAVE A STEADY PLACE TO SLEEP OR SLEPT IN A SHELTER (INCLUDING NOW)?: NO

## 2023-08-14 SDOH — ECONOMIC STABILITY: FOOD INSECURITY: WITHIN THE PAST 12 MONTHS, YOU WORRIED THAT YOUR FOOD WOULD RUN OUT BEFORE YOU GOT MONEY TO BUY MORE.: NEVER TRUE

## 2023-08-14 ASSESSMENT — ENCOUNTER SYMPTOMS
COUGH: 0
SHORTNESS OF BREATH: 0
EYE ITCHING: 0
CHEST TIGHTNESS: 0
BACK PAIN: 0
WHEEZING: 0
SINUS PRESSURE: 0

## 2023-08-14 ASSESSMENT — PATIENT HEALTH QUESTIONNAIRE - PHQ9
SUM OF ALL RESPONSES TO PHQ QUESTIONS 1-9: 0
1. LITTLE INTEREST OR PLEASURE IN DOING THINGS: 0
2. FEELING DOWN, DEPRESSED OR HOPELESS: 0
SUM OF ALL RESPONSES TO PHQ9 QUESTIONS 1 & 2: 0

## 2023-08-14 NOTE — PROGRESS NOTES
Nereyda Coronado (:  1940) is a 81 y.o. male,New patient, here for evaluation of the following chief complaint(s):  Chief Complaint   Patient presents with    Establish Care     Re-establish care with Dr. Praveen Cardona, medical history as noted, patient is fasting. Subjective   SUBJECTIVE/OBJECTIVE  HPI : Pt. Here to establish care with us, new to this practice, was seeing me at my previous practice. No acute concerns today. Follow up for chronic medical problems -hypertension, dyslipidemia, type 2 diabetes mellitus, acid reflux, gout and med refills. 1.  Hypertension--patient states he has been doing well on his current medications. Has seen his cardiologist recently and medications were refilled. Will check labs today. Denies any chest pains, palpitations, shortness of breath or swelling in the feet. 2.  Dyslipidemia--patient has been doing well on the atorvastatin 10 mg daily, denies any side effects. Will check a lipid panel today. 3.  Diabetes mellitus--patient has been doing well on the daily Synjardy and glipizide. Denies any side effects. Does not check his blood sugars daily. Has been eating better and drinking plenty of water. 4.  Acid reflux--patient has been doing well with weight loss and diet changes in the last 6 months. Does not take any prescription medications. Does take over-the-counter PPI or Pepcid as needed.     Past Medical History:   Diagnosis Date    Diabetes ([address])     HTN (hypertension)     Hyperlipemia     Kidney stone     Sleep apnea           Current Outpatient Medications:     lisinopril (PRINIVIL;ZESTRIL) 20 MG tablet, Take 1 tablet by mouth daily, Disp: 90 tablet, Rfl: 3    carvedilol (COREG) 12.5 MG tablet, Take 1 tablet by mouth 2 times daily, Disp: 180 tablet, Rfl: 3    Empagliflozin-metFORMIN HCl (SYNJARDY) 12.5-1000 MG TABS, Take by mouth, Disp: , Rfl:     acetaminophen (TYLENOL) 500 MG tablet, Take 1 tablet by mouth every 6 hours as needed, Disp:

## 2023-08-14 NOTE — PROGRESS NOTES
Chief Complaint   Patient presents with    [facility] Care     Re-establish care with Dr. Nilam Castillo, medical history as noted, patient is fasting. 1. \"Have you been to the ER, urgent care clinic since your last visit? Hospitalized since your last visit? \" no       2. \"Have you seen or consulted any other health care providers outside of the [address] [place] [address] since your last visit? \"    no       3. For patients aged 43-73: Has the patient had a colonoscopy / FIT/ Cologuard?  N/a        PHQ-9  7/2/2021   Little interest or pleasure in doing things -   Little interest or pleasure in doing things 0   Feeling down, depressed, or hopeless -   PHQ-2 Score -   Total Score PHQ 2 0   PHQ-9 Total Score -           Financial Resource Strain: Not on file      Food Insecurity: Not on file          Health Maintenance Due   Topic Date Due    Pneumococcal 65+ years Vaccine (1 - PCV) Never done    Lipids  Never done    Depression Screen  Never done    DTaP/Tdap/Td vaccine (1 - Tdap) Never done    Shingles vaccine (1 of 2) Never done    COVID-19 Vaccine (3 - Booster for [place] Seed series) 05/29/2021    Annual Wellness Visit (AWV)  Never done    Flu vaccine (1) Never done

## 2023-08-15 LAB
ALBUMIN SERPL-MCNC: 4.1 G/DL (ref 3.5–5)
ALBUMIN/GLOB SERPL: 1.3 (ref 1.1–2.2)
ALP SERPL-CCNC: 89 U/L (ref 45–117)
ALT SERPL-CCNC: 20 U/L (ref 12–78)
ANION GAP SERPL CALC-SCNC: 5 MMOL/L (ref 5–15)
AST SERPL-CCNC: 13 U/L (ref 15–37)
BASOPHILS # BLD: 0.1 K/UL (ref 0–0.1)
BASOPHILS NFR BLD: 1 % (ref 0–1)
BILIRUB SERPL-MCNC: 0.8 MG/DL (ref 0.2–1)
BUN SERPL-MCNC: 19 MG/DL (ref 6–20)
BUN/CREAT SERPL: 20 (ref 12–20)
CALCIUM SERPL-MCNC: 9.2 MG/DL (ref 8.5–10.1)
CHLORIDE SERPL-SCNC: 110 MMOL/L (ref 97–108)
CHOLEST SERPL-MCNC: 125 MG/DL
CO2 SERPL-SCNC: 25 MMOL/L (ref 21–32)
CREAT SERPL-MCNC: 0.96 MG/DL (ref 0.7–1.3)
DIFFERENTIAL METHOD BLD: NORMAL
EOSINOPHIL # BLD: 0.2 K/UL (ref 0–0.4)
EOSINOPHIL NFR BLD: 2 % (ref 0–7)
ERYTHROCYTE [DISTWIDTH] IN BLOOD BY AUTOMATED COUNT: 13.2 % (ref 11.5–14.5)
GLOBULIN SER CALC-MCNC: 3.1 G/DL (ref 2–4)
GLUCOSE SERPL-MCNC: 108 MG/DL (ref 65–100)
HCT VFR BLD AUTO: 46.1 % (ref 36.6–50.3)
HDLC SERPL-MCNC: 52 MG/DL
HDLC SERPL: 2.4 (ref 0–5)
HGB BLD-MCNC: 15 G/DL (ref 12.1–17)
IMM GRANULOCYTES # BLD AUTO: 0 K/UL (ref 0–0.04)
IMM GRANULOCYTES NFR BLD AUTO: 0 % (ref 0–0.5)
LDLC SERPL CALC-MCNC: 50.2 MG/DL (ref 0–100)
LYMPHOCYTES # BLD: 1.5 K/UL (ref 0.8–3.5)
LYMPHOCYTES NFR BLD: 15 % (ref 12–49)
MCH RBC QN AUTO: 30.8 PG (ref 26–34)
MCHC RBC AUTO-ENTMCNC: 32.5 G/DL (ref 30–36.5)
MCV RBC AUTO: 94.7 FL (ref 80–99)
MONOCYTES # BLD: 0.7 K/UL (ref 0–1)
MONOCYTES NFR BLD: 7 % (ref 5–13)
NEUTS SEG # BLD: 7.5 K/UL (ref 1.8–8)
NEUTS SEG NFR BLD: 75 % (ref 32–75)
NRBC # BLD: 0 K/UL (ref 0–0.01)
NRBC BLD-RTO: 0 PER 100 WBC
PLATELET # BLD AUTO: 233 K/UL (ref 150–400)
PMV BLD AUTO: 10.1 FL (ref 8.9–12.9)
POTASSIUM SERPL-SCNC: 4.7 MMOL/L (ref 3.5–5.1)
PROT SERPL-MCNC: 7.2 G/DL (ref 6.4–8.2)
RBC # BLD AUTO: 4.87 M/UL (ref 4.1–5.7)
SODIUM SERPL-SCNC: 140 MMOL/L (ref 136–145)
TRIGL SERPL-MCNC: 114 MG/DL
TSH SERPL DL<=0.05 MIU/L-ACNC: 0.6 UIU/ML (ref 0.36–3.74)
VLDLC SERPL CALC-MCNC: 22.8 MG/DL
WBC # BLD AUTO: 9.9 K/UL (ref 4.1–11.1)

## 2023-10-15 NOTE — TELEPHONE ENCOUNTER
Per VO of Dr. Sofía Vasquez: 9/25/2020    Future Appointments   Date Time Provider Nelida Rogers   6/28/2021  1:40 PM Ny Kaur MD [facility]       Requested Prescriptions     Pending Prescriptions Disp Refills    lisinopriL (PRINIVIL, ZESTRIL) 20 mg tablet [Pharmacy Med Name: LISINOPRIL 20 MG TABLET] 90 Tab 1     Sig: TAKE 1 TABLET BY MOUTH EVERY DAY
 used

## 2023-10-25 ENCOUNTER — OFFICE VISIT (OUTPATIENT)
Age: 83
End: 2023-10-25
Payer: MEDICARE

## 2023-10-25 VITALS
OXYGEN SATURATION: 98 % | BODY MASS INDEX: 28.32 KG/M2 | HEART RATE: 73 BPM | TEMPERATURE: 97.8 F | DIASTOLIC BLOOD PRESSURE: 79 MMHG | SYSTOLIC BLOOD PRESSURE: 127 MMHG | HEIGHT: 65 IN | WEIGHT: 170 LBS

## 2023-10-25 DIAGNOSIS — M10.00 IDIOPATHIC GOUT, UNSPECIFIED CHRONICITY, UNSPECIFIED SITE: ICD-10-CM

## 2023-10-25 DIAGNOSIS — Z23 NEED FOR INFLUENZA VACCINATION: ICD-10-CM

## 2023-10-25 DIAGNOSIS — K21.9 GASTROESOPHAGEAL REFLUX DISEASE WITHOUT ESOPHAGITIS: ICD-10-CM

## 2023-10-25 DIAGNOSIS — Z00.00 MEDICARE ANNUAL WELLNESS VISIT, SUBSEQUENT: Primary | ICD-10-CM

## 2023-10-25 DIAGNOSIS — I25.10 CORONARY ARTERY DISEASE INVOLVING NATIVE CORONARY ARTERY OF NATIVE HEART WITHOUT ANGINA PECTORIS: ICD-10-CM

## 2023-10-25 DIAGNOSIS — E11.9 TYPE 2 DIABETES MELLITUS WITHOUT COMPLICATION, WITHOUT LONG-TERM CURRENT USE OF INSULIN (HCC): ICD-10-CM

## 2023-10-25 DIAGNOSIS — G47.33 OSA ON CPAP: ICD-10-CM

## 2023-10-25 DIAGNOSIS — I10 HYPERTENSION, UNSPECIFIED TYPE: ICD-10-CM

## 2023-10-25 DIAGNOSIS — E78.2 MIXED HYPERLIPIDEMIA: ICD-10-CM

## 2023-10-25 PROBLEM — K85.90 ACUTE PANCREATITIS: Status: RESOLVED | Noted: 2020-09-16 | Resolved: 2023-10-25

## 2023-10-25 PROCEDURE — PBSHW INFLUENZA, FLUAD, (AGE 65 Y+), IM, PF, 0.5 ML: Performed by: INTERNAL MEDICINE

## 2023-10-25 PROCEDURE — 99214 OFFICE O/P EST MOD 30 MIN: CPT | Performed by: INTERNAL MEDICINE

## 2023-10-25 PROCEDURE — 3078F DIAST BP <80 MM HG: CPT | Performed by: INTERNAL MEDICINE

## 2023-10-25 PROCEDURE — 3074F SYST BP LT 130 MM HG: CPT | Performed by: INTERNAL MEDICINE

## 2023-10-25 PROCEDURE — 3044F HG A1C LEVEL LT 7.0%: CPT | Performed by: INTERNAL MEDICINE

## 2023-10-25 PROCEDURE — 1123F ACP DISCUSS/DSCN MKR DOCD: CPT | Performed by: INTERNAL MEDICINE

## 2023-10-25 PROCEDURE — G0439 PPPS, SUBSEQ VISIT: HCPCS | Performed by: INTERNAL MEDICINE

## 2023-10-25 PROCEDURE — 90694 VACC AIIV4 NO PRSRV 0.5ML IM: CPT | Performed by: INTERNAL MEDICINE

## 2023-10-25 ASSESSMENT — PATIENT HEALTH QUESTIONNAIRE - PHQ9
1. LITTLE INTEREST OR PLEASURE IN DOING THINGS: 0
SUM OF ALL RESPONSES TO PHQ QUESTIONS 1-9: 0

## 2023-10-25 ASSESSMENT — ENCOUNTER SYMPTOMS
BACK PAIN: 0
WHEEZING: 0
EYE ITCHING: 0
CHEST TIGHTNESS: 0
SINUS PRESSURE: 0
COUGH: 0
SHORTNESS OF BREATH: 0

## 2023-10-25 ASSESSMENT — LIFESTYLE VARIABLES
HOW MANY STANDARD DRINKS CONTAINING ALCOHOL DO YOU HAVE ON A TYPICAL DAY: 3 OR 4
HOW OFTEN DO YOU HAVE A DRINK CONTAINING ALCOHOL: 2-4 TIMES A MONTH

## 2023-10-25 NOTE — PROGRESS NOTES
[facility]  [address] [facility] [address]  Phone: [phone]     January 30, 2017      Rafy Zepeda  [address] [place] [address]              To whom it may concern,    Rafy Zepeda was seen in our clinic today for medical illness for the last 7 days.  Anticipate return to work or school by 2/1/2017. (depending on his symptoms)    Sincerely,      Caron Wadsworth NP/dw    
[facility]  [address] [facility] [address]  Phone: [phone]     January 30, 2017      Rafy Zepeda  [address] [place] [address]              To whom it may concern,    Rafy Zepeda was seen in our clinic today for medical illness for the last 7 days.  He was able to work 1/27/2017 thru 1/30/2017. Anticipate return to work or school by 1/31/2017.       Sincerely,    Caron Wadsworth NP/dw    
Chief Complaint   Patient presents with    Medicare AWV     Labs were done in 8/2023         1. \"Have you been to the ER, urgent care clinic since your last visit? Hospitalized since your last visit? \"  no      2. \"Have you seen or consulted any other health care providers outside of the [address] [place] [address] since your last visit? \"     no      3. For patients aged 43-73: Has the patient had a colonoscopy / FIT/ Cologuard?  N/a            10/25/2023     3:03 PM   PHQ-9    Little interest or pleasure in doing things 0   PHQ-9 Total Score 0           Financial Resource Strain: Low Risk  (8/14/2023)    Overall Financial Resource Strain (CARDIA)     Difficulty of Paying Living Expenses: Not hard at all      Food Insecurity: No Food Insecurity (8/14/2023)    Hunger Vital Sign     Worried About Running Out of Food in the Last Year: Never true     Ran Out of Food in the Last Year: Never true          Health Maintenance Due   Topic Date Due    Pneumococcal 65+ years Vaccine (1 - PCV) Never done    DTaP/Tdap/Td vaccine (1 - Tdap) Never done    Shingles vaccine (1 of 2) Never done    Hepatitis B vaccine (1 of 3 - Risk 3-dose series) Never done    COVID-19 Vaccine (3 - [place] Seed series) 05/29/2021    Annual Wellness Visit (AWV)  Never done    Flu vaccine (1) Never done
exposure: Past    Smokeless tobacco: Never   Vaping Use    Vaping Use: Never used   Substance and Sexual Activity    Alcohol use: Never     Alcohol/week: 0.0 standard drinks of alcohol    Drug use: Never     Social Determinants of Health     Financial Resource Strain: Low Risk  (8/14/2023)    Overall Financial Resource Strain (CARDIA)     Difficulty of Paying Living Expenses: Not hard at all   Food Insecurity: No Food Insecurity (8/14/2023)    Hunger Vital Sign     Worried About Running Out of Food in the Last Year: Never true     Ran Out of Food in the Last Year: Never true   Transportation Needs: Unknown (8/14/2023)    PRAPARE - Transportation     Lack of Transportation (Non-Medical): No   Physical Activity: Insufficiently Active (10/25/2023)    Exercise Vital Sign     Days of Exercise per Week: 2 days     Minutes of Exercise per Session: 30 min   Housing Stability: Unknown (8/14/2023)    Housing Stability Vital Sign     Unstable Housing in the Last Year: No        Past Surgical History:   Procedure Laterality Date    CHOLECYSTECTOMY  2021    COLONOSCOPY  09/06/2011    Dr. Audie Moyer  2015    2 drug eluting stents placed (LAD), 80-90% residual blockage still    HERNIA REPAIR  1995    LITHOTRIPSY      3-4 times        Family History   Problem Relation Age of Onset    Colon Cancer Mother     Other Father         Angina    Coronary Art Dis Father     Heart Attack Father     Cancer Sister     No Known Problems Son     No Known Problems Daughter     No Known Problems Daughter       Objective   Physical Exam  Vitals and nursing note reviewed. Constitutional:       Appearance: Normal appearance. HENT:      Head: Normocephalic and atraumatic. Eyes:      Extraocular Movements: Extraocular movements intact. Pupils: Pupils are equal, round, and reactive to light.    Cardiovascular:      Rate and Rhythm: Normal rate and regular

## 2024-03-26 ENCOUNTER — NURSE ONLY (OUTPATIENT)
Age: 84
End: 2024-03-26

## 2024-03-26 DIAGNOSIS — E78.2 MIXED HYPERLIPIDEMIA: ICD-10-CM

## 2024-03-26 DIAGNOSIS — I10 HYPERTENSION, UNSPECIFIED TYPE: ICD-10-CM

## 2024-03-26 DIAGNOSIS — E11.9 TYPE 2 DIABETES MELLITUS WITHOUT COMPLICATION, WITHOUT LONG-TERM CURRENT USE OF INSULIN (HCC): ICD-10-CM

## 2024-03-26 LAB
ALBUMIN SERPL-MCNC: 4.1 G/DL (ref 3.5–5)
ALBUMIN/GLOB SERPL: 1.3 (ref 1.1–2.2)
ALP SERPL-CCNC: 92 U/L (ref 45–117)
ALT SERPL-CCNC: 21 U/L (ref 12–78)
ANION GAP SERPL CALC-SCNC: 3 MMOL/L (ref 5–15)
AST SERPL-CCNC: 8 U/L (ref 15–37)
BASOPHILS # BLD: 0.1 K/UL (ref 0–0.1)
BASOPHILS NFR BLD: 1 % (ref 0–1)
BILIRUB SERPL-MCNC: 0.7 MG/DL (ref 0.2–1)
BUN SERPL-MCNC: 18 MG/DL (ref 6–20)
BUN/CREAT SERPL: 17 (ref 12–20)
CALCIUM SERPL-MCNC: 9.2 MG/DL (ref 8.5–10.1)
CHLORIDE SERPL-SCNC: 112 MMOL/L (ref 97–108)
CHOLEST SERPL-MCNC: 135 MG/DL
CO2 SERPL-SCNC: 26 MMOL/L (ref 21–32)
CREAT SERPL-MCNC: 1.08 MG/DL (ref 0.7–1.3)
CREAT UR-MCNC: 98.9 MG/DL
DIFFERENTIAL METHOD BLD: ABNORMAL
EOSINOPHIL # BLD: 0.2 K/UL (ref 0–0.4)
EOSINOPHIL NFR BLD: 2 % (ref 0–7)
ERYTHROCYTE [DISTWIDTH] IN BLOOD BY AUTOMATED COUNT: 13.5 % (ref 11.5–14.5)
EST. AVERAGE GLUCOSE BLD GHB EST-MCNC: 143 MG/DL
GLOBULIN SER CALC-MCNC: 3.1 G/DL (ref 2–4)
GLUCOSE SERPL-MCNC: 169 MG/DL (ref 65–100)
HBA1C MFR BLD: 6.6 % (ref 4–5.6)
HCT VFR BLD AUTO: 43 % (ref 36.6–50.3)
HDLC SERPL-MCNC: 60 MG/DL
HDLC SERPL: 2.3 (ref 0–5)
HGB BLD-MCNC: 14.7 G/DL (ref 12.1–17)
IMM GRANULOCYTES # BLD AUTO: 0.1 K/UL (ref 0–0.04)
IMM GRANULOCYTES NFR BLD AUTO: 1 % (ref 0–0.5)
LDLC SERPL CALC-MCNC: 53.8 MG/DL (ref 0–100)
LYMPHOCYTES # BLD: 1.4 K/UL (ref 0.8–3.5)
LYMPHOCYTES NFR BLD: 14 % (ref 12–49)
MCH RBC QN AUTO: 31.6 PG (ref 26–34)
MCHC RBC AUTO-ENTMCNC: 34.2 G/DL (ref 30–36.5)
MCV RBC AUTO: 92.5 FL (ref 80–99)
MICROALBUMIN UR-MCNC: 1.36 MG/DL
MICROALBUMIN/CREAT UR-RTO: 14 MG/G (ref 0–30)
MONOCYTES # BLD: 0.7 K/UL (ref 0–1)
MONOCYTES NFR BLD: 8 % (ref 5–13)
NEUTS SEG # BLD: 7.3 K/UL (ref 1.8–8)
NEUTS SEG NFR BLD: 74 % (ref 32–75)
NRBC # BLD: 0 K/UL (ref 0–0.01)
NRBC BLD-RTO: 0 PER 100 WBC
PLATELET # BLD AUTO: 216 K/UL (ref 150–400)
PMV BLD AUTO: 10.2 FL (ref 8.9–12.9)
POTASSIUM SERPL-SCNC: 4.8 MMOL/L (ref 3.5–5.1)
PROT SERPL-MCNC: 7.2 G/DL (ref 6.4–8.2)
RBC # BLD AUTO: 4.65 M/UL (ref 4.1–5.7)
SODIUM SERPL-SCNC: 141 MMOL/L (ref 136–145)
TRIGL SERPL-MCNC: 106 MG/DL
TSH SERPL DL<=0.05 MIU/L-ACNC: 1.9 UIU/ML (ref 0.36–3.74)
URATE SERPL-MCNC: 5.2 MG/DL (ref 3.5–7.2)
VLDLC SERPL CALC-MCNC: 21.2 MG/DL
WBC # BLD AUTO: 9.7 K/UL (ref 4.1–11.1)

## 2024-03-26 RX ORDER — ATORVASTATIN CALCIUM 10 MG/1
10 TABLET, FILM COATED ORAL NIGHTLY
Qty: 90 TABLET | Refills: 0 | Status: SHIPPED | OUTPATIENT
Start: 2024-03-26

## 2024-04-01 ENCOUNTER — OFFICE VISIT (OUTPATIENT)
Age: 84
End: 2024-04-01
Payer: MEDICARE

## 2024-04-01 VITALS
OXYGEN SATURATION: 98 % | WEIGHT: 171 LBS | DIASTOLIC BLOOD PRESSURE: 74 MMHG | BODY MASS INDEX: 28.49 KG/M2 | TEMPERATURE: 98.4 F | HEART RATE: 64 BPM | SYSTOLIC BLOOD PRESSURE: 130 MMHG | HEIGHT: 65 IN

## 2024-04-01 DIAGNOSIS — E55.9 VITAMIN D DEFICIENCY, UNSPECIFIED: ICD-10-CM

## 2024-04-01 DIAGNOSIS — I25.10 CORONARY ARTERY DISEASE INVOLVING NATIVE CORONARY ARTERY OF NATIVE HEART WITHOUT ANGINA PECTORIS: ICD-10-CM

## 2024-04-01 DIAGNOSIS — I10 HYPERTENSION, UNSPECIFIED TYPE: Primary | ICD-10-CM

## 2024-04-01 DIAGNOSIS — E78.2 MIXED HYPERLIPIDEMIA: ICD-10-CM

## 2024-04-01 DIAGNOSIS — M10.00 IDIOPATHIC GOUT, UNSPECIFIED CHRONICITY, UNSPECIFIED SITE: ICD-10-CM

## 2024-04-01 DIAGNOSIS — E11.9 TYPE 2 DIABETES MELLITUS WITHOUT COMPLICATION, WITHOUT LONG-TERM CURRENT USE OF INSULIN (HCC): ICD-10-CM

## 2024-04-01 DIAGNOSIS — K21.9 GASTROESOPHAGEAL REFLUX DISEASE WITHOUT ESOPHAGITIS: ICD-10-CM

## 2024-04-01 DIAGNOSIS — I71.21 ANEURYSM OF ASCENDING AORTA WITHOUT RUPTURE (HCC): ICD-10-CM

## 2024-04-01 PROCEDURE — 3075F SYST BP GE 130 - 139MM HG: CPT | Performed by: INTERNAL MEDICINE

## 2024-04-01 PROCEDURE — 1123F ACP DISCUSS/DSCN MKR DOCD: CPT | Performed by: INTERNAL MEDICINE

## 2024-04-01 PROCEDURE — 99214 OFFICE O/P EST MOD 30 MIN: CPT | Performed by: INTERNAL MEDICINE

## 2024-04-01 PROCEDURE — G8427 DOCREV CUR MEDS BY ELIG CLIN: HCPCS | Performed by: INTERNAL MEDICINE

## 2024-04-01 PROCEDURE — G8419 CALC BMI OUT NRM PARAM NOF/U: HCPCS | Performed by: INTERNAL MEDICINE

## 2024-04-01 PROCEDURE — 3078F DIAST BP <80 MM HG: CPT | Performed by: INTERNAL MEDICINE

## 2024-04-01 PROCEDURE — 1036F TOBACCO NON-USER: CPT | Performed by: INTERNAL MEDICINE

## 2024-04-01 PROCEDURE — 3044F HG A1C LEVEL LT 7.0%: CPT | Performed by: INTERNAL MEDICINE

## 2024-04-01 RX ORDER — EMPAGLIFLOZIN AND METFORMIN HYDROCHLORIDE 12.5; 1 MG/1; MG/1
1 TABLET ORAL
Qty: 90 TABLET | Refills: 2 | Status: SHIPPED | OUTPATIENT
Start: 2024-04-01

## 2024-04-01 RX ORDER — ATORVASTATIN CALCIUM 10 MG/1
10 TABLET, FILM COATED ORAL NIGHTLY
Qty: 90 TABLET | Refills: 2 | Status: SHIPPED | OUTPATIENT
Start: 2024-04-01

## 2024-04-01 RX ORDER — GLIPIZIDE 2.5 MG/1
5 TABLET, EXTENDED RELEASE ORAL 2 TIMES DAILY WITH MEALS
Qty: 180 TABLET | Refills: 2 | Status: SHIPPED | OUTPATIENT
Start: 2024-04-01

## 2024-04-01 ASSESSMENT — ENCOUNTER SYMPTOMS
COUGH: 0
CHEST TIGHTNESS: 0
SHORTNESS OF BREATH: 0
EYE ITCHING: 0
BACK PAIN: 0
WHEEZING: 0
SINUS PRESSURE: 0

## 2024-04-01 ASSESSMENT — PATIENT HEALTH QUESTIONNAIRE - PHQ9
SUM OF ALL RESPONSES TO PHQ QUESTIONS 1-9: 0
SUM OF ALL RESPONSES TO PHQ QUESTIONS 1-9: 0
SUM OF ALL RESPONSES TO PHQ9 QUESTIONS 1 & 2: 0
SUM OF ALL RESPONSES TO PHQ QUESTIONS 1-9: 0
SUM OF ALL RESPONSES TO PHQ QUESTIONS 1-9: 0
1. LITTLE INTEREST OR PLEASURE IN DOING THINGS: NOT AT ALL
2. FEELING DOWN, DEPRESSED OR HOPELESS: NOT AT ALL

## 2024-04-01 NOTE — PROGRESS NOTES
Chief Complaint   Patient presents with    Medication Refill     Review labs.         1. \"Have you been to the ER, urgent care clinic since your last visit?  Hospitalized since your last visit?\"    no    2. \"Have you seen or consulted any other health care providers outside of the [facility] since your last visit?\"      Yes, Dr. Whitley                 4/1/2024     1:33 PM   PHQ-9    Little interest or pleasure in doing things 0   Feeling down, depressed, or hopeless 0   PHQ-2 Score 0   PHQ-9 Total Score 0           Financial Resource Strain: Low Risk  (8/14/2023)    Overall Financial Resource Strain (CARDIA)     Difficulty of Paying Living Expenses: Not hard at all      Food Insecurity: Not on file (8/14/2023)          Health Maintenance Due   Topic Date Due    DTaP/Tdap/Td vaccine (1 - Tdap) Never done    Shingles vaccine (1 of 2) Never done    Respiratory Syncytial Virus (RSV) Pregnant or age 60 yrs+ (1 - 1-dose 60+ series) Never done    COVID-19 Vaccine (3 - 2023-24 season) 09/01/2023      
atraumatic.   Eyes:      Extraocular Movements: Extraocular movements intact.      Pupils: Pupils are equal, round, and reactive to light.   Cardiovascular:      Rate and Rhythm: Normal rate and regular rhythm.      Pulses: Normal pulses.      Heart sounds: Normal heart sounds.   Pulmonary:      Effort: Pulmonary effort is normal.      Breath sounds: Normal breath sounds. No wheezing, rhonchi or rales.   Musculoskeletal:         General: Normal range of motion.      Cervical back: Neck supple.   Skin:     General: Skin is warm.      Findings: No bruising or rash.   Neurological:      General: No focal deficit present.      Mental Status: He is alert and oriented to person, place, and time.   Psychiatric:         Mood and Affect: Mood normal.         Behavior: Behavior normal.          /74 (Site: Left Upper Arm, Position: Sitting, Cuff Size: Medium Adult)   Pulse 64   Temp 98.4 °F (36.9 °C) (Temporal)   Ht 1.651 m (5' 5\")   Wt 77.6 kg (171 lb)   SpO2 98%   BMI 28.46 kg/m²      Lab Results   Component Value Date    WBC 9.7 03/26/2024    HGB 14.7 03/26/2024    HCT 43.0 03/26/2024    MCV 92.5 03/26/2024     03/26/2024    LYMPHOPCT 14 03/26/2024    RBC 4.65 03/26/2024    MCH 31.6 03/26/2024    MCHC 34.2 03/26/2024    RDW 13.5 03/26/2024     Lab Results   Component Value Date     03/26/2024    K 4.8 03/26/2024     (H) 03/26/2024    CO2 26 03/26/2024    BUN 18 03/26/2024    CREATININE 1.08 03/26/2024    GLUCOSE 169 (H) 03/26/2024    CALCIUM 9.2 03/26/2024    PROT 7.2 03/26/2024    LABALBU 4.1 03/26/2024    BILITOT 0.7 03/26/2024    ALKPHOS 92 03/26/2024    AST 8 (L) 03/26/2024    ALT 21 03/26/2024    LABGLOM 69 03/26/2024    GFRAA >60 05/25/2022    AGRATIO 1.3 03/26/2024    GLOB 3.1 03/26/2024     Lab Results   Component Value Date    VWR9RMZ 1.90 03/26/2024     Lab Results   Component Value Date    CHOL 135 03/26/2024    CHOL 125 08/14/2023     Lab Results   Component Value Date    TRIG 106

## 2024-04-03 NOTE — ASSESSMENT & PLAN NOTE
Stable on his current medications, labs discussed in details today continue with his glipizide and Synjardy as directed, refills done.

## 2024-06-25 ENCOUNTER — OFFICE VISIT (OUTPATIENT)
Age: 84
End: 2024-06-25
Payer: MEDICARE

## 2024-06-25 VITALS
TEMPERATURE: 98 F | HEIGHT: 65 IN | BODY MASS INDEX: 27.99 KG/M2 | DIASTOLIC BLOOD PRESSURE: 74 MMHG | HEART RATE: 60 BPM | WEIGHT: 168 LBS | SYSTOLIC BLOOD PRESSURE: 122 MMHG | OXYGEN SATURATION: 98 %

## 2024-06-25 DIAGNOSIS — H25.013 CORTICAL AGE-RELATED CATARACT OF BOTH EYES: ICD-10-CM

## 2024-06-25 DIAGNOSIS — E11.9 TYPE 2 DIABETES MELLITUS WITHOUT COMPLICATION, WITHOUT LONG-TERM CURRENT USE OF INSULIN (HCC): ICD-10-CM

## 2024-06-25 DIAGNOSIS — Z01.818 PREOP GENERAL PHYSICAL EXAM: Primary | ICD-10-CM

## 2024-06-25 PROCEDURE — 99214 OFFICE O/P EST MOD 30 MIN: CPT | Performed by: INTERNAL MEDICINE

## 2024-06-25 PROCEDURE — 1123F ACP DISCUSS/DSCN MKR DOCD: CPT | Performed by: INTERNAL MEDICINE

## 2024-06-25 PROCEDURE — G8419 CALC BMI OUT NRM PARAM NOF/U: HCPCS | Performed by: INTERNAL MEDICINE

## 2024-06-25 PROCEDURE — 1036F TOBACCO NON-USER: CPT | Performed by: INTERNAL MEDICINE

## 2024-06-25 PROCEDURE — G8427 DOCREV CUR MEDS BY ELIG CLIN: HCPCS | Performed by: INTERNAL MEDICINE

## 2024-06-25 PROCEDURE — 3078F DIAST BP <80 MM HG: CPT | Performed by: INTERNAL MEDICINE

## 2024-06-25 PROCEDURE — 3044F HG A1C LEVEL LT 7.0%: CPT | Performed by: INTERNAL MEDICINE

## 2024-06-25 PROCEDURE — 3074F SYST BP LT 130 MM HG: CPT | Performed by: INTERNAL MEDICINE

## 2024-06-25 RX ORDER — GLIPIZIDE 5 MG/1
5 TABLET, FILM COATED, EXTENDED RELEASE ORAL
COMMUNITY
End: 2024-06-25 | Stop reason: SDUPTHER

## 2024-06-25 RX ORDER — GLIPIZIDE 5 MG/1
5 TABLET, FILM COATED, EXTENDED RELEASE ORAL
Qty: 180 TABLET | Refills: 1 | Status: SHIPPED | OUTPATIENT
Start: 2024-06-25

## 2024-06-25 ASSESSMENT — ENCOUNTER SYMPTOMS
BACK PAIN: 0
WHEEZING: 0
CHEST TIGHTNESS: 0
COUGH: 0
SHORTNESS OF BREATH: 0
EYE ITCHING: 0
SINUS PRESSURE: 0

## 2024-06-25 NOTE — PROGRESS NOTES
Pre-op Review:  Procedure/Surgery:  Bilateral cataract extraction.  Date of Procedure/Surgery: Right eye--7/18/2025.  Left eye--7/25/2024  Surgeon: Dr. Kenton Garcia.  Hospital/Surgical Facility: [facility]  Primary Physician: Nelia Killian MD     Reason for surgery: Age-related cataracts       ALLERGIES     Allergies   Allergen Reactions    Sulfa Antibiotics      Other reaction(s): Unknown (comments)  Then he was 7 yo, unsure of reaction           Medications     Prior to Admission medications    Medication Sig Start Date End Date Taking? Authorizing Provider   glipiZIDE (GLUCOTROL XL) 5 MG extended release tablet Take 1 tablet by mouth 2 times daily (before meals) 6/25/24  Yes Nelia Killian MD   atorvastatin (LIPITOR) 10 MG tablet Take 1 tablet by mouth nightly 4/1/24  Yes Nelia Killian MD   Empagliflozin-metFORMIN HCl (SYNJARDY) 12.5-1000 MG TABS Take 1 tablet by mouth every morning (before breakfast) 4/1/24  Yes Nelia Killian MD   lisinopril (PRINIVIL;ZESTRIL) 20 MG tablet Take 1 tablet by mouth daily 8/8/23  Yes Christian Whitley MD   carvedilol (COREG) 12.5 MG tablet Take 1 tablet by mouth 2 times daily 8/8/23  Yes Christian Whitley MD   acetaminophen (TYLENOL) 500 MG tablet Take 1 tablet by mouth every 6 hours as needed   Yes Automatic Reconciliation, Ar   aspirin 81 MG chewable tablet Take 1 tablet by mouth daily 12/10/15  Yes Automatic Reconciliation, Ar   colchicine (COLCRYS) 0.6 MG tablet Take 1 tablet by mouth as needed   Yes Automatic Reconciliation, Ar   fluticasone (FLONASE) 50 MCG/ACT nasal spray 2 sprays by Nasal route as needed   Yes Automatic Reconciliation, Ar        Past Medical History:   Diagnosis Date    Actinic keratosis     Acute pancreatitis 09/16/2020    CAD (coronary artery disease) 12/09/2015    Diabetes (HCC)     Gastroesophageal reflux disease without esophagitis 08/14/2023    Gout     History of avascular necrosis of capital femoral epiphysis

## 2024-06-25 NOTE — PROGRESS NOTES
Chief Complaint   Patient presents with    Pre-op Exam     Pre op: Cataract extraction. Right Eye on 7/18/24 and left eye 7/25/24 with Dr. Garcia         1. \"Have you been to the ER or a urgent care clinic since your last visit?  Hospitalized since your last visit?\"    no    2. \"Have you seen or consulted any other health care providers outside of the [facility] since your last visit?\"   no            Click Here for Release of Records Request       Health Maintenance Due   Topic Date Due    DTaP/Tdap/Td vaccine (1 - Tdap) Never done    Shingles vaccine (1 of 2) Never done    Respiratory Syncytial Virus (RSV) Pregnant or age 60 yrs+ (1 - 1-dose 60+ series) Never done    COVID-19 Vaccine (3 - 2023-24 season) 09/01/2023 4/1/2024     1:33 PM   PHQ-9    Little interest or pleasure in doing things 0   Feeling down, depressed, or hopeless 0   PHQ-2 Score 0   PHQ-9 Total Score 0           Financial Resource Strain: Low Risk  (8/14/2023)    Overall Financial Resource Strain (CARDIA)     Difficulty of Paying Living Expenses: Not hard at all      Food Insecurity: Not on file (8/14/2023)

## 2024-07-22 ENCOUNTER — OFFICE VISIT (OUTPATIENT)
Age: 84
End: 2024-07-22
Payer: MEDICARE

## 2024-07-22 VITALS
OXYGEN SATURATION: 100 % | SYSTOLIC BLOOD PRESSURE: 130 MMHG | HEART RATE: 62 BPM | BODY MASS INDEX: 27.99 KG/M2 | HEIGHT: 65 IN | WEIGHT: 168 LBS | DIASTOLIC BLOOD PRESSURE: 72 MMHG

## 2024-07-22 DIAGNOSIS — I71.21 ANEURYSM OF ASCENDING AORTA WITHOUT RUPTURE (HCC): Primary | ICD-10-CM

## 2024-07-22 DIAGNOSIS — I35.1 NONRHEUMATIC AORTIC VALVE INSUFFICIENCY: ICD-10-CM

## 2024-07-22 DIAGNOSIS — I10 ESSENTIAL (PRIMARY) HYPERTENSION: ICD-10-CM

## 2024-07-22 DIAGNOSIS — I35.1 AORTIC INSUFFICIENCY: ICD-10-CM

## 2024-07-22 DIAGNOSIS — I25.10 CORONARY ARTERY DISEASE INVOLVING NATIVE CORONARY ARTERY OF NATIVE HEART WITHOUT ANGINA PECTORIS: ICD-10-CM

## 2024-07-22 DIAGNOSIS — I71.21 ANEURYSM, ASCENDING AORTA (HCC): Primary | ICD-10-CM

## 2024-07-22 PROCEDURE — 1123F ACP DISCUSS/DSCN MKR DOCD: CPT | Performed by: INTERNAL MEDICINE

## 2024-07-22 PROCEDURE — 99214 OFFICE O/P EST MOD 30 MIN: CPT | Performed by: INTERNAL MEDICINE

## 2024-07-22 PROCEDURE — 3078F DIAST BP <80 MM HG: CPT | Performed by: INTERNAL MEDICINE

## 2024-07-22 PROCEDURE — 1036F TOBACCO NON-USER: CPT | Performed by: INTERNAL MEDICINE

## 2024-07-22 PROCEDURE — 3075F SYST BP GE 130 - 139MM HG: CPT | Performed by: INTERNAL MEDICINE

## 2024-07-22 PROCEDURE — G8427 DOCREV CUR MEDS BY ELIG CLIN: HCPCS | Performed by: INTERNAL MEDICINE

## 2024-07-22 PROCEDURE — G8419 CALC BMI OUT NRM PARAM NOF/U: HCPCS | Performed by: INTERNAL MEDICINE

## 2024-07-22 NOTE — PROGRESS NOTES
Chief Complaint   Patient presents with    Other     AAA  EARL    Hypertension     Vitals:    07/22/24 1304   BP: 130/72   Site: Left Upper Arm   Position: Sitting   Pulse: 62   SpO2: 100%   Weight: 76.2 kg (168 lb)   Height: 1.651 m (5' 5\")     Chest pain: DENIED     Recent hospital stays: DENIED     Refills: DENIED   
not demonstrate   any significant viability and has a large infarct and will not recover upon   revascularization. The LAD territory and RCA territories demonstrate   significant viability and does not demonstrate any infarct.  5. Normal pleura and pericardium. There is no significant effusions.  6. Mildly dilated ascending aorta measuring 40 x 30 mm.  7. Possibly enlarged thyroid with possible thyroid nodule.      Lab Review:     Lab Results   Component Value Date/Time    Cholesterol, Total 146 01/06/2016 07:49 AM     Lab Results   Component Value Date/Time    Creatinine (POC) 0.90 05/25/2022 08:22 AM    Creatinine 0.96 09/19/2020 04:23 AM     Lab Results   Component Value Date/Time    BUN 17 09/19/2020 04:23 AM     Lab Results   Component Value Date/Time    Potassium 3.6 09/19/2020 04:23 AM     No results found for: HBA1C, ZWO3NAGT, KFC0SLQP  Lab Results   Component Value Date/Time    HGB 12.5 09/19/2020 04:23 AM     Lab Results   Component Value Date/Time    PLATELET 212 09/19/2020 04:23 AM     No results for input(s): CPK, CKMB, TROIQ in the last 72 hours.    No lab exists for component: CKQMB, CPKMB             ___________________________________________________    Christian Whitley MD, FACC

## 2024-07-22 NOTE — PATIENT INSTRUCTIONS
Echo- Aortic aneurysm, aortic regurgitation    Cardiac MRI non contrast with Conscious Sedation in 1 yr from now for aortic aneurysm and aortic regurgitation.

## 2024-09-09 ENCOUNTER — ANCILLARY PROCEDURE (OUTPATIENT)
Age: 84
End: 2024-09-09
Payer: MEDICARE

## 2024-09-09 VITALS
DIASTOLIC BLOOD PRESSURE: 78 MMHG | SYSTOLIC BLOOD PRESSURE: 126 MMHG | HEIGHT: 65 IN | WEIGHT: 168 LBS | BODY MASS INDEX: 27.99 KG/M2

## 2024-09-09 DIAGNOSIS — I71.21 ANEURYSM, ASCENDING AORTA (HCC): ICD-10-CM

## 2024-09-09 PROCEDURE — 93306 TTE W/DOPPLER COMPLETE: CPT | Performed by: INTERNAL MEDICINE

## 2024-09-10 LAB
ECHO AO ASC DIAM: 4.2 CM
ECHO AO ASCENDING AORTA INDEX: 2.28 CM/M2
ECHO AO ROOT DIAM: 3.7 CM
ECHO AO ROOT INDEX: 2.01 CM/M2
ECHO AR MAX VEL PISA: 2.8 M/S
ECHO AV MEAN GRADIENT: 7 MMHG
ECHO AV MEAN VELOCITY: 1.2 M/S
ECHO AV PEAK GRADIENT: 16 MMHG
ECHO AV PEAK VELOCITY: 2 M/S
ECHO AV REGURGITANT PHT: 804.9 MILLISECOND
ECHO AV VELOCITY RATIO: 0.5
ECHO AV VTI: 39.3 CM
ECHO BSA: 1.87 M2
ECHO EST RA PRESSURE: 3 MMHG
ECHO LA DIAMETER INDEX: 2.01 CM/M2
ECHO LA DIAMETER: 3.7 CM
ECHO LA TO AORTIC ROOT RATIO: 1
ECHO LA VOL A-L A2C: 51 ML (ref 18–58)
ECHO LA VOL A-L A4C: 62 ML (ref 18–58)
ECHO LA VOL BP: 54 ML (ref 18–58)
ECHO LA VOL MOD A2C: 48 ML (ref 18–58)
ECHO LA VOL MOD A4C: 59 ML (ref 18–58)
ECHO LA VOL/BSA BIPLANE: 29 ML/M2 (ref 16–34)
ECHO LA VOLUME AREA LENGTH: 58 ML
ECHO LA VOLUME INDEX A-L A2C: 28 ML/M2 (ref 16–34)
ECHO LA VOLUME INDEX A-L A4C: 34 ML/M2 (ref 16–34)
ECHO LA VOLUME INDEX AREA LENGTH: 32 ML/M2 (ref 16–34)
ECHO LA VOLUME INDEX MOD A2C: 26 ML/M2 (ref 16–34)
ECHO LA VOLUME INDEX MOD A4C: 32 ML/M2 (ref 16–34)
ECHO LV E' LATERAL VELOCITY: 7 CM/S
ECHO LV E' SEPTAL VELOCITY: 7 CM/S
ECHO LV EDV A2C: 53 ML
ECHO LV EDV A4C: 111 ML
ECHO LV EDV BP: 78 ML (ref 67–155)
ECHO LV EDV INDEX A4C: 60 ML/M2
ECHO LV EDV INDEX BP: 42 ML/M2
ECHO LV EDV NDEX A2C: 29 ML/M2
ECHO LV EJECTION FRACTION A2C: 49 %
ECHO LV EJECTION FRACTION A4C: 67 %
ECHO LV EJECTION FRACTION BIPLANE: 59 % (ref 55–100)
ECHO LV ESV A2C: 27 ML
ECHO LV ESV A4C: 37 ML
ECHO LV ESV BP: 32 ML (ref 22–58)
ECHO LV ESV INDEX A2C: 15 ML/M2
ECHO LV ESV INDEX A4C: 20 ML/M2
ECHO LV ESV INDEX BP: 17 ML/M2
ECHO LV FRACTIONAL SHORTENING: 25 % (ref 28–44)
ECHO LV INTERNAL DIMENSION DIASTOLE INDEX: 2.77 CM/M2
ECHO LV INTERNAL DIMENSION DIASTOLIC: 5.1 CM (ref 4.2–5.9)
ECHO LV INTERNAL DIMENSION SYSTOLIC INDEX: 2.07 CM/M2
ECHO LV INTERNAL DIMENSION SYSTOLIC: 3.8 CM
ECHO LV IVSD: 1.3 CM (ref 0.6–1)
ECHO LV MASS 2D: 241.2 G (ref 88–224)
ECHO LV MASS INDEX 2D: 131.1 G/M2 (ref 49–115)
ECHO LV POSTERIOR WALL DIASTOLIC: 1.1 CM (ref 0.6–1)
ECHO LV RELATIVE WALL THICKNESS RATIO: 0.43
ECHO LVOT AV VTI INDEX: 0.59
ECHO LVOT MEAN GRADIENT: 2 MMHG
ECHO LVOT PEAK GRADIENT: 4 MMHG
ECHO LVOT PEAK VELOCITY: 1 M/S
ECHO LVOT VTI: 23.2 CM
ECHO MV A VELOCITY: 1.02 M/S
ECHO MV AREA PHT: 1.7 CM2
ECHO MV E DECELERATION TIME (DT): 437.6 MS
ECHO MV E VELOCITY: 0.61 M/S
ECHO MV E/A RATIO: 0.6
ECHO MV E/E' LATERAL: 8.71
ECHO MV E/E' RATIO (AVERAGED): 8.71
ECHO MV E/E' SEPTAL: 8.71
ECHO MV PRESSURE HALF TIME (PHT): 126.9 MS
ECHO RA AREA 4C: 14.3 CM2
ECHO RA END SYSTOLIC VOLUME APICAL 4 CHAMBER INDEX BSA: 19 ML/M2
ECHO RA VOLUME AREA LENGTH APICAL 4 CHAMBER: 35 ML
ECHO RA VOLUME: 35 ML
ECHO RIGHT VENTRICULAR SYSTOLIC PRESSURE (RVSP): 19 MMHG
ECHO RV FREE WALL PEAK S': 20 CM/S
ECHO RV INTERNAL DIMENSION: 4.1 CM
ECHO RV TAPSE: 2.6 CM (ref 1.7–?)
ECHO TV REGURGITANT MAX VELOCITY: 1.97 M/S
ECHO TV REGURGITANT PEAK GRADIENT: 16 MMHG

## 2024-09-10 PROCEDURE — 93306 TTE W/DOPPLER COMPLETE: CPT | Performed by: INTERNAL MEDICINE

## 2024-09-24 RX ORDER — LISINOPRIL 20 MG/1
20 TABLET ORAL DAILY
Qty: 90 TABLET | Refills: 3 | Status: SHIPPED | OUTPATIENT
Start: 2024-09-24

## 2024-09-24 RX ORDER — CARVEDILOL 12.5 MG/1
12.5 TABLET ORAL 2 TIMES DAILY
Qty: 180 TABLET | Refills: 3 | Status: SHIPPED | OUTPATIENT
Start: 2024-09-24

## 2024-10-28 ENCOUNTER — LAB (OUTPATIENT)
Age: 84
End: 2024-10-28

## 2024-10-28 DIAGNOSIS — I10 HYPERTENSION, UNSPECIFIED TYPE: ICD-10-CM

## 2024-10-28 DIAGNOSIS — M10.00 IDIOPATHIC GOUT, UNSPECIFIED CHRONICITY, UNSPECIFIED SITE: ICD-10-CM

## 2024-10-28 DIAGNOSIS — E11.9 TYPE 2 DIABETES MELLITUS WITHOUT COMPLICATION, WITHOUT LONG-TERM CURRENT USE OF INSULIN (HCC): ICD-10-CM

## 2024-10-28 DIAGNOSIS — E78.2 MIXED HYPERLIPIDEMIA: ICD-10-CM

## 2024-10-28 DIAGNOSIS — E55.9 VITAMIN D DEFICIENCY, UNSPECIFIED: ICD-10-CM

## 2024-10-28 LAB
COMMENT:: NORMAL
SPECIMEN HOLD: NORMAL
T4 FREE SERPL-MCNC: 1.1 NG/DL (ref 0.8–1.5)
TSH SERPL DL<=0.05 MIU/L-ACNC: 1.4 UIU/ML (ref 0.36–3.74)

## 2024-10-29 LAB
25(OH)D3 SERPL-MCNC: 13.2 NG/ML (ref 30–100)
ALBUMIN SERPL-MCNC: 4 G/DL (ref 3.5–5)
ALBUMIN/GLOB SERPL: 1.4 (ref 1.1–2.2)
ALP SERPL-CCNC: 79 U/L (ref 45–117)
ALT SERPL-CCNC: 23 U/L (ref 12–78)
ANION GAP SERPL CALC-SCNC: 7 MMOL/L (ref 2–12)
AST SERPL-CCNC: 14 U/L (ref 15–37)
BASOPHILS # BLD: 0.1 K/UL (ref 0–0.1)
BASOPHILS NFR BLD: 1 % (ref 0–1)
BILIRUB SERPL-MCNC: 0.5 MG/DL (ref 0.2–1)
BUN SERPL-MCNC: 24 MG/DL (ref 6–20)
BUN/CREAT SERPL: 19 (ref 12–20)
CALCIUM SERPL-MCNC: 9.2 MG/DL (ref 8.5–10.1)
CHLORIDE SERPL-SCNC: 108 MMOL/L (ref 97–108)
CHOLEST SERPL-MCNC: 119 MG/DL
CO2 SERPL-SCNC: 25 MMOL/L (ref 21–32)
CREAT SERPL-MCNC: 1.25 MG/DL (ref 0.7–1.3)
DIFFERENTIAL METHOD BLD: ABNORMAL
EOSINOPHIL # BLD: 1.2 K/UL (ref 0–0.4)
EOSINOPHIL NFR BLD: 12 % (ref 0–7)
ERYTHROCYTE [DISTWIDTH] IN BLOOD BY AUTOMATED COUNT: 13.5 % (ref 11.5–14.5)
EST. AVERAGE GLUCOSE BLD GHB EST-MCNC: 126 MG/DL
GLOBULIN SER CALC-MCNC: 2.9 G/DL (ref 2–4)
GLUCOSE SERPL-MCNC: 136 MG/DL (ref 65–100)
HBA1C MFR BLD: 6 % (ref 4–5.6)
HCT VFR BLD AUTO: 42.9 % (ref 36.6–50.3)
HDLC SERPL-MCNC: 54 MG/DL
HDLC SERPL: 2.2 (ref 0–5)
HGB BLD-MCNC: 14.1 G/DL (ref 12.1–17)
IMM GRANULOCYTES # BLD AUTO: 0.1 K/UL (ref 0–0.04)
IMM GRANULOCYTES NFR BLD AUTO: 1 % (ref 0–0.5)
LDLC SERPL CALC-MCNC: 47 MG/DL (ref 0–100)
LYMPHOCYTES # BLD: 1.3 K/UL (ref 0.8–3.5)
LYMPHOCYTES NFR BLD: 13 % (ref 12–49)
MCH RBC QN AUTO: 31.3 PG (ref 26–34)
MCHC RBC AUTO-ENTMCNC: 32.9 G/DL (ref 30–36.5)
MCV RBC AUTO: 95.1 FL (ref 80–99)
MONOCYTES # BLD: 0.6 K/UL (ref 0–1)
MONOCYTES NFR BLD: 6 % (ref 5–13)
NEUTS SEG # BLD: 6.7 K/UL (ref 1.8–8)
NEUTS SEG NFR BLD: 67 % (ref 32–75)
NRBC # BLD: 0 K/UL (ref 0–0.01)
NRBC BLD-RTO: 0 PER 100 WBC
PLATELET # BLD AUTO: 204 K/UL (ref 150–400)
PMV BLD AUTO: 10.2 FL (ref 8.9–12.9)
POTASSIUM SERPL-SCNC: 4.3 MMOL/L (ref 3.5–5.1)
PROT SERPL-MCNC: 6.9 G/DL (ref 6.4–8.2)
RBC # BLD AUTO: 4.51 M/UL (ref 4.1–5.7)
RBC MORPH BLD: ABNORMAL
SODIUM SERPL-SCNC: 140 MMOL/L (ref 136–145)
TRIGL SERPL-MCNC: 90 MG/DL
URATE SERPL-MCNC: 5.5 MG/DL (ref 3.5–7.2)
VLDLC SERPL CALC-MCNC: 18 MG/DL
WBC # BLD AUTO: 10 K/UL (ref 4.1–11.1)

## 2024-11-07 ENCOUNTER — OFFICE VISIT (OUTPATIENT)
Age: 84
End: 2024-11-07
Payer: MEDICARE

## 2024-11-07 VITALS
OXYGEN SATURATION: 98 % | TEMPERATURE: 97.5 F | HEIGHT: 65 IN | BODY MASS INDEX: 27.66 KG/M2 | SYSTOLIC BLOOD PRESSURE: 122 MMHG | HEART RATE: 58 BPM | WEIGHT: 166 LBS | DIASTOLIC BLOOD PRESSURE: 73 MMHG

## 2024-11-07 DIAGNOSIS — E11.9 TYPE 2 DIABETES MELLITUS WITHOUT COMPLICATION, WITHOUT LONG-TERM CURRENT USE OF INSULIN (HCC): ICD-10-CM

## 2024-11-07 DIAGNOSIS — M10.00 IDIOPATHIC GOUT, UNSPECIFIED CHRONICITY, UNSPECIFIED SITE: ICD-10-CM

## 2024-11-07 DIAGNOSIS — R39.15 BENIGN PROSTATIC HYPERPLASIA (BPH) WITH URINARY URGENCY: ICD-10-CM

## 2024-11-07 DIAGNOSIS — I25.10 CORONARY ARTERY DISEASE INVOLVING NATIVE CORONARY ARTERY OF NATIVE HEART WITHOUT ANGINA PECTORIS: ICD-10-CM

## 2024-11-07 DIAGNOSIS — E55.9 VITAMIN D DEFICIENCY, UNSPECIFIED: ICD-10-CM

## 2024-11-07 DIAGNOSIS — Z23 NEED FOR INFLUENZA VACCINATION: ICD-10-CM

## 2024-11-07 DIAGNOSIS — K21.9 GASTROESOPHAGEAL REFLUX DISEASE WITHOUT ESOPHAGITIS: ICD-10-CM

## 2024-11-07 DIAGNOSIS — E78.2 MIXED HYPERLIPIDEMIA: ICD-10-CM

## 2024-11-07 DIAGNOSIS — I10 HYPERTENSION, UNSPECIFIED TYPE: ICD-10-CM

## 2024-11-07 DIAGNOSIS — N40.1 BENIGN PROSTATIC HYPERPLASIA (BPH) WITH URINARY URGENCY: ICD-10-CM

## 2024-11-07 DIAGNOSIS — Z00.00 MEDICARE ANNUAL WELLNESS VISIT, SUBSEQUENT: Primary | ICD-10-CM

## 2024-11-07 PROCEDURE — 99214 OFFICE O/P EST MOD 30 MIN: CPT | Performed by: INTERNAL MEDICINE

## 2024-11-07 PROCEDURE — 90653 IIV ADJUVANT VACCINE IM: CPT | Performed by: INTERNAL MEDICINE

## 2024-11-07 RX ORDER — GLIPIZIDE 5 MG/1
5 TABLET, FILM COATED, EXTENDED RELEASE ORAL DAILY
Qty: 90 TABLET | Refills: 1 | Status: SHIPPED | OUTPATIENT
Start: 2024-11-07

## 2024-11-07 RX ORDER — LORATADINE 10 MG/1
10 TABLET ORAL DAILY
COMMUNITY

## 2024-11-07 RX ORDER — ERGOCALCIFEROL 1.25 MG/1
50000 CAPSULE, LIQUID FILLED ORAL WEEKLY
Qty: 12 CAPSULE | Refills: 1 | Status: SHIPPED | OUTPATIENT
Start: 2024-11-07

## 2024-11-07 SDOH — ECONOMIC STABILITY: FOOD INSECURITY: WITHIN THE PAST 12 MONTHS, YOU WORRIED THAT YOUR FOOD WOULD RUN OUT BEFORE YOU GOT MONEY TO BUY MORE.: NEVER TRUE

## 2024-11-07 SDOH — ECONOMIC STABILITY: INCOME INSECURITY: HOW HARD IS IT FOR YOU TO PAY FOR THE VERY BASICS LIKE FOOD, HOUSING, MEDICAL CARE, AND HEATING?: NOT HARD AT ALL

## 2024-11-07 SDOH — ECONOMIC STABILITY: FOOD INSECURITY: WITHIN THE PAST 12 MONTHS, THE FOOD YOU BOUGHT JUST DIDN'T LAST AND YOU DIDN'T HAVE MONEY TO GET MORE.: NEVER TRUE

## 2024-11-07 ASSESSMENT — ENCOUNTER SYMPTOMS
CHEST TIGHTNESS: 0
COUGH: 0
BACK PAIN: 0
SHORTNESS OF BREATH: 0
EYE ITCHING: 0
SINUS PRESSURE: 0
WHEEZING: 0

## 2024-11-07 ASSESSMENT — PATIENT HEALTH QUESTIONNAIRE - PHQ9
SUM OF ALL RESPONSES TO PHQ QUESTIONS 1-9: 0
2. FEELING DOWN, DEPRESSED OR HOPELESS: NOT AT ALL
SUM OF ALL RESPONSES TO PHQ QUESTIONS 1-9: 0
SUM OF ALL RESPONSES TO PHQ9 QUESTIONS 1 & 2: 0
1. LITTLE INTEREST OR PLEASURE IN DOING THINGS: NOT AT ALL

## 2024-11-07 ASSESSMENT — LIFESTYLE VARIABLES
HOW MANY STANDARD DRINKS CONTAINING ALCOHOL DO YOU HAVE ON A TYPICAL DAY: 1 OR 2
HOW OFTEN DO YOU HAVE A DRINK CONTAINING ALCOHOL: MONTHLY OR LESS

## 2024-11-07 NOTE — PROGRESS NOTES
importance of compliance with the treatment plan as well as documenting on the day of the visit.      An electronic signature was used to authenticate this note.    --Nelia Killian MD       Treatment risks/benefits/costs/interactions/alternatives discussed with patient.  Advised patient to call back or return to office if symptoms worsen/change/persist. If patient cannot reach us or should anything more severe/urgent arise he/she should proceed directly to the nearest emergency department.  Discussed expected course/resolution/complications of diagnosis in detail with patient.  Patient expressed understanding with the diagnosis and plan.     This dictation may have been completed with Dragon, the computer voice recognition software.  Unanticipated grammatical, syntax, homophones, and other interpretive errors are sometimes inadvertently transcribed by the computer software.  Please disregard any errors that have escaped final proofreading.      Nelia Killian M.D

## 2024-11-07 NOTE — PATIENT INSTRUCTIONS
Hearing Loss: Care Instructions  Overview     Hearing loss is a sudden or slow decrease in how well you hear. It can range from slight to profound. Permanent hearing loss can occur with aging. It also can happen when you are exposed long-term to loud noise. Examples include listening to loud music, riding motorcycles, or being around other loud machines.  Hearing loss can affect your work and home life. It can make you feel lonely or depressed. You may feel that you have lost your independence. But hearing aids and other devices can help you hear better and feel connected to others.  Follow-up care is a key part of your treatment and safety. Be sure to make and go to all appointments, and call your doctor if you are having problems. It's also a good idea to know your test results and keep a list of the medicines you take.  How can you care for yourself at home?  Avoid loud noises whenever possible. This helps keep your hearing from getting worse.  Always wear hearing protection around loud noises.  Wear a hearing aid as directed.  A professional can help you pick a hearing aid that will work best for you.  You can also get hearing aids over the counter for mild to moderate hearing loss.  Have hearing tests as your doctor suggests. They can show whether your hearing has changed. Your hearing aid may need to be adjusted.  Use other devices as needed. These may include:  Telephone amplifiers and hearing aids that can connect to a television, stereo, radio, or microphone.  Devices that use lights or vibrations. These alert you to the doorbell, a ringing telephone, or a baby monitor.  Television closed-captioning. This shows the words at the bottom of the screen. Most new TVs can do this.  TTY (text telephone). This lets you type messages back and forth on the telephone instead of talking or listening. These devices are also called TDD. When messages are typed on the keyboard, they are sent over the phone line to a  -

## 2024-12-18 DIAGNOSIS — E11.9 TYPE 2 DIABETES MELLITUS WITHOUT COMPLICATION, WITHOUT LONG-TERM CURRENT USE OF INSULIN (HCC): ICD-10-CM

## 2024-12-19 RX ORDER — EMPAGLIFLOZIN AND METFORMIN HYDROCHLORIDE 12.5; 1 MG/1; MG/1
1 TABLET ORAL
Qty: 90 TABLET | Refills: 1 | Status: SHIPPED | OUTPATIENT
Start: 2024-12-19

## 2025-01-31 DIAGNOSIS — E55.9 VITAMIN D DEFICIENCY, UNSPECIFIED: ICD-10-CM

## 2025-02-03 RX ORDER — ERGOCALCIFEROL 1.25 MG/1
50000 CAPSULE, LIQUID FILLED ORAL WEEKLY
Qty: 12 CAPSULE | Refills: 1 | Status: SHIPPED | OUTPATIENT
Start: 2025-02-03

## 2025-02-24 ENCOUNTER — TELEPHONE (OUTPATIENT)
Age: 85
End: 2025-02-24

## 2025-02-24 NOTE — TELEPHONE ENCOUNTER
WILLIAM ESCOTO asked that I call pt, he is having a concern with unexplained weight loss and would like a sooner appt. Can pt come in Wed 2/26 at 12:40pm?

## 2025-02-24 NOTE — TELEPHONE ENCOUNTER
----- Message from Domingo ESCALANTE sent at 2/21/2025  3:00 PM EST -----  Regarding: [facility] Appointment Request  [facility] Appointment Request    Patient needs appointment for [facility] Appointment Type: Existing Condition Follow Up.    Patient Requested Dates(s): As soon as possible  Patient Requested Time:As soon as possible  Provider Name: Nelia Killian MD      Reason for Appointment Request: Established Patient - Available appointments did not meet patient need  --------------------------------------------------------------------------------------------------------------------------    Relationship to Patient: Self     Call Back Information: OK to leave message on voicemail  Preferred Call Back Number:[phone]

## 2025-02-26 ENCOUNTER — OFFICE VISIT (OUTPATIENT)
Age: 85
End: 2025-02-26
Payer: MEDICARE

## 2025-02-26 VITALS
OXYGEN SATURATION: 98 % | SYSTOLIC BLOOD PRESSURE: 145 MMHG | BODY MASS INDEX: 26.49 KG/M2 | RESPIRATION RATE: 16 BRPM | HEART RATE: 62 BPM | WEIGHT: 159 LBS | TEMPERATURE: 97.5 F | DIASTOLIC BLOOD PRESSURE: 76 MMHG | HEIGHT: 65 IN

## 2025-02-26 DIAGNOSIS — E78.2 MIXED HYPERLIPIDEMIA: ICD-10-CM

## 2025-02-26 DIAGNOSIS — E53.8 VITAMIN B12 DEFICIENCY: ICD-10-CM

## 2025-02-26 DIAGNOSIS — M10.00 IDIOPATHIC GOUT, UNSPECIFIED CHRONICITY, UNSPECIFIED SITE: ICD-10-CM

## 2025-02-26 DIAGNOSIS — I10 HYPERTENSION, UNSPECIFIED TYPE: ICD-10-CM

## 2025-02-26 DIAGNOSIS — E11.9 TYPE 2 DIABETES MELLITUS WITHOUT COMPLICATION, WITHOUT LONG-TERM CURRENT USE OF INSULIN (HCC): ICD-10-CM

## 2025-02-26 DIAGNOSIS — N40.0 BENIGN PROSTATIC HYPERPLASIA WITHOUT LOWER URINARY TRACT SYMPTOMS: ICD-10-CM

## 2025-02-26 DIAGNOSIS — R63.4 WEIGHT LOSS, UNINTENTIONAL: Primary | ICD-10-CM

## 2025-02-26 DIAGNOSIS — E55.9 VITAMIN D DEFICIENCY, UNSPECIFIED: ICD-10-CM

## 2025-02-26 PROCEDURE — G2211 COMPLEX E/M VISIT ADD ON: HCPCS | Performed by: INTERNAL MEDICINE

## 2025-02-26 PROCEDURE — G8427 DOCREV CUR MEDS BY ELIG CLIN: HCPCS | Performed by: INTERNAL MEDICINE

## 2025-02-26 PROCEDURE — 3078F DIAST BP <80 MM HG: CPT | Performed by: INTERNAL MEDICINE

## 2025-02-26 PROCEDURE — 1160F RVW MEDS BY RX/DR IN RCRD: CPT | Performed by: INTERNAL MEDICINE

## 2025-02-26 PROCEDURE — 1159F MED LIST DOCD IN RCRD: CPT | Performed by: INTERNAL MEDICINE

## 2025-02-26 PROCEDURE — 1036F TOBACCO NON-USER: CPT | Performed by: INTERNAL MEDICINE

## 2025-02-26 PROCEDURE — 99214 OFFICE O/P EST MOD 30 MIN: CPT | Performed by: INTERNAL MEDICINE

## 2025-02-26 PROCEDURE — G8419 CALC BMI OUT NRM PARAM NOF/U: HCPCS | Performed by: INTERNAL MEDICINE

## 2025-02-26 PROCEDURE — 3077F SYST BP >= 140 MM HG: CPT | Performed by: INTERNAL MEDICINE

## 2025-02-26 PROCEDURE — 1126F AMNT PAIN NOTED NONE PRSNT: CPT | Performed by: INTERNAL MEDICINE

## 2025-02-26 PROCEDURE — 1123F ACP DISCUSS/DSCN MKR DOCD: CPT | Performed by: INTERNAL MEDICINE

## 2025-02-26 RX ORDER — GLIPIZIDE 5 MG/1
5 TABLET, FILM COATED, EXTENDED RELEASE ORAL DAILY
Qty: 90 TABLET | Refills: 1 | Status: SHIPPED | OUTPATIENT
Start: 2025-02-26

## 2025-02-26 SDOH — ECONOMIC STABILITY: FOOD INSECURITY: WITHIN THE PAST 12 MONTHS, THE FOOD YOU BOUGHT JUST DIDN'T LAST AND YOU DIDN'T HAVE MONEY TO GET MORE.: NEVER TRUE

## 2025-02-26 SDOH — ECONOMIC STABILITY: FOOD INSECURITY: WITHIN THE PAST 12 MONTHS, YOU WORRIED THAT YOUR FOOD WOULD RUN OUT BEFORE YOU GOT MONEY TO BUY MORE.: NEVER TRUE

## 2025-02-26 ASSESSMENT — ENCOUNTER SYMPTOMS
WHEEZING: 0
EYE ITCHING: 0
SINUS PRESSURE: 0
BACK PAIN: 0
CHEST TIGHTNESS: 0
SHORTNESS OF BREATH: 0
COUGH: 0

## 2025-02-26 ASSESSMENT — PATIENT HEALTH QUESTIONNAIRE - PHQ9
SUM OF ALL RESPONSES TO PHQ QUESTIONS 1-9: 0
SUM OF ALL RESPONSES TO PHQ9 QUESTIONS 1 & 2: 0
1. LITTLE INTEREST OR PLEASURE IN DOING THINGS: NOT AT ALL
2. FEELING DOWN, DEPRESSED OR HOPELESS: NOT AT ALL

## 2025-02-26 NOTE — PROGRESS NOTES
Chief Complaint   Patient presents with    Weight Loss    Follow-up Chronic Condition     \"Have you been to the ER, urgent care clinic since your last visit?  Hospitalized since your last visit?\"    no    “Have you seen or consulted any other health care providers outside our system since your last visit?”    no    NOT FASTING

## 2025-02-26 NOTE — PROGRESS NOTES
Duong Vanegas (:  1940) is a 84 y.o. male,Established patient, here for evaluation of the following chief complaint(s):   Chief Complaint   Patient presents with    Weight Loss    Follow-up Chronic Condition       HPI   Subjective   SUBJECTIVE/OBJECTIVE  HPI : Patient is here for 4-month follow-up on chronic medical conditions including hypertension, dyslipidemia, coronary artery disease, type 2 diabetes, acid reflux, discussed lab results and medication refills.  Patient states he has overall been doing well with no concerns today.  Has been taking his medications as prescribed.  General review  Patient states he has been steadily losing weight over the last 6 to 9 months.  He has been tracking his weight when he goes to different specialists including his cardiologist and his pulmonologist.  In the last 4 months he has lost 7 pounds but in the last year he has lost over 20 pounds.  Patient states he is not eating as much because he recently had dental implants done and the taste of food has changed.  He also thinks during the hunting season he eats less.  He also eats only when he is hungry.  He does not cook and mostly eats out or prepared foods.  Acid reflux--patient has been doing well with weight loss and diet changes in the last 6 months.  Does not take any prescription medications.  Does take over-the-counter PPI or Pepcid as needed.   Cardiovascular review  Hypertension--patient states he has been doing well on his current medications.  Has seen his cardiologist recently and medications were refilled.  Denies any chest pains, palpitations, shortness of breath or swelling in the feet.  Dyslipidemia--patient has been doing well on the atorvastatin 10 mg daily, denies any side effects.  Will check a lipid panel today.  Endocrine review  Diabetes mellitus--patient has been doing well on the daily Synjardy and glipizide.  Denies any side effects.  Does not check his blood sugars daily.  Has been

## 2025-02-27 LAB
ALBUMIN SERPL-MCNC: 3.9 G/DL (ref 3.5–5)
ALBUMIN/GLOB SERPL: 1.2 (ref 1.1–2.2)
ALP SERPL-CCNC: 99 U/L (ref 45–117)
ALT SERPL-CCNC: 31 U/L (ref 12–78)
ANION GAP SERPL CALC-SCNC: 6 MMOL/L (ref 2–12)
AST SERPL-CCNC: 20 U/L (ref 15–37)
BASOPHILS # BLD: 0.06 K/UL (ref 0–0.1)
BASOPHILS NFR BLD: 0.6 % (ref 0–1)
BILIRUB SERPL-MCNC: 0.8 MG/DL (ref 0.2–1)
BUN SERPL-MCNC: 17 MG/DL (ref 6–20)
BUN/CREAT SERPL: 16 (ref 12–20)
CALCIUM SERPL-MCNC: 9.5 MG/DL (ref 8.5–10.1)
CHLORIDE SERPL-SCNC: 108 MMOL/L (ref 97–108)
CO2 SERPL-SCNC: 24 MMOL/L (ref 21–32)
CREAT SERPL-MCNC: 1.04 MG/DL (ref 0.7–1.3)
DIFFERENTIAL METHOD BLD: ABNORMAL
EOSINOPHIL # BLD: 0.12 K/UL (ref 0–0.4)
EOSINOPHIL NFR BLD: 1.2 % (ref 0–7)
ERYTHROCYTE [DISTWIDTH] IN BLOOD BY AUTOMATED COUNT: 13.1 % (ref 11.5–14.5)
FOLATE SERPL-MCNC: 17.1 NG/ML (ref 5–21)
GLOBULIN SER CALC-MCNC: 3.2 G/DL (ref 2–4)
GLUCOSE SERPL-MCNC: 123 MG/DL (ref 65–100)
HCT VFR BLD AUTO: 42.5 % (ref 36.6–50.3)
HGB BLD-MCNC: 14.1 G/DL (ref 12.1–17)
IMM GRANULOCYTES # BLD AUTO: 0.06 K/UL (ref 0–0.04)
IMM GRANULOCYTES NFR BLD AUTO: 0.6 % (ref 0–0.5)
LYMPHOCYTES # BLD: 1.5 K/UL (ref 0.8–3.5)
LYMPHOCYTES NFR BLD: 14.5 % (ref 12–49)
MCH RBC QN AUTO: 30.9 PG (ref 26–34)
MCHC RBC AUTO-ENTMCNC: 33.2 G/DL (ref 30–36.5)
MCV RBC AUTO: 93 FL (ref 80–99)
MONOCYTES # BLD: 0.65 K/UL (ref 0–1)
MONOCYTES NFR BLD: 6.3 % (ref 5–13)
NEUTS SEG # BLD: 7.97 K/UL (ref 1.8–8)
NEUTS SEG NFR BLD: 76.8 % (ref 32–75)
NRBC # BLD: 0 K/UL (ref 0–0.01)
NRBC BLD-RTO: 0 PER 100 WBC
PLATELET # BLD AUTO: 232 K/UL (ref 150–400)
PMV BLD AUTO: 10.4 FL (ref 8.9–12.9)
POTASSIUM SERPL-SCNC: 4.4 MMOL/L (ref 3.5–5.1)
PROT SERPL-MCNC: 7.1 G/DL (ref 6.4–8.2)
RBC # BLD AUTO: 4.57 M/UL (ref 4.1–5.7)
SODIUM SERPL-SCNC: 138 MMOL/L (ref 136–145)
VIT B12 SERPL-MCNC: 191 PG/ML (ref 193–986)
WBC # BLD AUTO: 10.4 K/UL (ref 4.1–11.1)

## 2025-02-28 LAB
PSA FREE MFR SERPL: 25 %
PSA FREE SERPL-MCNC: 0.6 NG/ML
PSA SERPL-MCNC: 2.4 NG/ML (ref 0–4)

## 2025-03-24 ENCOUNTER — LAB (OUTPATIENT)
Age: 85
End: 2025-03-24

## 2025-03-24 DIAGNOSIS — M10.00 IDIOPATHIC GOUT, UNSPECIFIED CHRONICITY, UNSPECIFIED SITE: ICD-10-CM

## 2025-03-24 DIAGNOSIS — E55.9 VITAMIN D DEFICIENCY, UNSPECIFIED: ICD-10-CM

## 2025-03-24 DIAGNOSIS — E11.9 TYPE 2 DIABETES MELLITUS WITHOUT COMPLICATION, WITHOUT LONG-TERM CURRENT USE OF INSULIN: ICD-10-CM

## 2025-03-24 DIAGNOSIS — E78.2 MIXED HYPERLIPIDEMIA: ICD-10-CM

## 2025-03-24 LAB
25(OH)D3 SERPL-MCNC: 38.1 NG/ML (ref 30–100)
CHOLEST SERPL-MCNC: 121 MG/DL
EST. AVERAGE GLUCOSE BLD GHB EST-MCNC: 146 MG/DL
HBA1C MFR BLD: 6.7 % (ref 4–5.6)
HDLC SERPL-MCNC: 55 MG/DL
HDLC SERPL: 2.2 (ref 0–5)
LDLC SERPL CALC-MCNC: 40 MG/DL (ref 0–100)
TRIGL SERPL-MCNC: 130 MG/DL
URATE SERPL-MCNC: 5.4 MG/DL (ref 3.5–7.2)
VLDLC SERPL CALC-MCNC: 26 MG/DL

## 2025-03-25 ENCOUNTER — RESULTS FOLLOW-UP (OUTPATIENT)
Age: 85
End: 2025-03-25

## 2025-03-27 DIAGNOSIS — E11.9 TYPE 2 DIABETES MELLITUS WITHOUT COMPLICATION, WITHOUT LONG-TERM CURRENT USE OF INSULIN (HCC): ICD-10-CM

## 2025-03-27 RX ORDER — GLIPIZIDE 2.5 MG/1
2.5 TABLET, EXTENDED RELEASE ORAL DAILY
Qty: 90 TABLET | Refills: 0 | Status: SHIPPED | OUTPATIENT
Start: 2025-03-27 | End: 2025-03-31

## 2025-03-27 NOTE — TELEPHONE ENCOUNTER
Please send glipizide as discussed. Pt had been doing 5mg, okay to reduce to 2.5mg daily due to hypoglycemic episodes.

## 2025-03-31 DIAGNOSIS — E11.9 TYPE 2 DIABETES MELLITUS WITHOUT COMPLICATION, WITHOUT LONG-TERM CURRENT USE OF INSULIN: ICD-10-CM

## 2025-03-31 RX ORDER — GLIPIZIDE 2.5 MG/1
2.5 TABLET, EXTENDED RELEASE ORAL DAILY
Qty: 90 TABLET | Refills: 0 | Status: SHIPPED | OUTPATIENT
Start: 2025-03-31

## 2025-03-31 NOTE — TELEPHONE ENCOUNTER
PCP: Nelia Killian MD    Last appt: 2/26/2025       Future Appointments   Date Time Provider Department Center   5/13/2025  9:40 AM Nelia Killian MD [facility]   7/23/2025  2:00 PM Christian Whitley MD [facility]       Requested Prescriptions     Pending Prescriptions Disp Refills    glipiZIDE (GLUCOTROL XL) 2.5 MG extended release tablet [Pharmacy Med Name: GLIPIZIDE ER 2.5MG TABLETS] 90 tablet 0     Sig: TAKE 1 TABLET BY MOUTH DAILY       Prior labs and Blood pressures:  BP Readings from Last 3 Encounters:   02/26/25 (!) 145/76   11/07/24 122/73   09/09/24 126/78     Lab Results   Component Value Date/Time     02/26/2025 01:44 PM    K 4.4 02/26/2025 01:44 PM     02/26/2025 01:44 PM    CO2 24 02/26/2025 01:44 PM    BUN 17 02/26/2025 01:44 PM    GFRAA >60 05/25/2022 08:22 AM     No results found for: \"HBA1C\", \"[id number]\"  Lab Results   Component Value Date/Time    CHOL 121 03/24/2025 08:23 AM    HDL 55 03/24/2025 08:23 AM    LDL 40 03/24/2025 08:23 AM    LDL 53.8 03/26/2024 08:20 AM    VLDL 26 03/24/2025 08:23 AM     No results found for: \"VITD3\"    Lab Results   Component Value Date/Time    TSH 1.40 10/28/2024 08:29 AM

## 2025-05-13 ENCOUNTER — OFFICE VISIT (OUTPATIENT)
Age: 85
End: 2025-05-13
Payer: MEDICARE

## 2025-05-13 VITALS
WEIGHT: 164 LBS | HEART RATE: 60 BPM | TEMPERATURE: 98.2 F | OXYGEN SATURATION: 98 % | HEIGHT: 65 IN | SYSTOLIC BLOOD PRESSURE: 152 MMHG | BODY MASS INDEX: 27.32 KG/M2 | DIASTOLIC BLOOD PRESSURE: 76 MMHG

## 2025-05-13 DIAGNOSIS — E11.9 TYPE 2 DIABETES MELLITUS WITHOUT COMPLICATION, WITHOUT LONG-TERM CURRENT USE OF INSULIN (HCC): ICD-10-CM

## 2025-05-13 DIAGNOSIS — I10 HYPERTENSION, UNSPECIFIED TYPE: Primary | ICD-10-CM

## 2025-05-13 DIAGNOSIS — E78.2 MIXED HYPERLIPIDEMIA: ICD-10-CM

## 2025-05-13 DIAGNOSIS — I25.10 CORONARY ARTERY DISEASE INVOLVING NATIVE CORONARY ARTERY OF NATIVE HEART WITHOUT ANGINA PECTORIS: ICD-10-CM

## 2025-05-13 DIAGNOSIS — M10.00 IDIOPATHIC GOUT, UNSPECIFIED CHRONICITY, UNSPECIFIED SITE: ICD-10-CM

## 2025-05-13 LAB — HBA1C MFR BLD: 6.4 %

## 2025-05-13 PROCEDURE — 99214 OFFICE O/P EST MOD 30 MIN: CPT | Performed by: INTERNAL MEDICINE

## 2025-05-13 PROCEDURE — 1159F MED LIST DOCD IN RCRD: CPT | Performed by: INTERNAL MEDICINE

## 2025-05-13 PROCEDURE — 3044F HG A1C LEVEL LT 7.0%: CPT | Performed by: INTERNAL MEDICINE

## 2025-05-13 PROCEDURE — G2211 COMPLEX E/M VISIT ADD ON: HCPCS | Performed by: INTERNAL MEDICINE

## 2025-05-13 PROCEDURE — G8427 DOCREV CUR MEDS BY ELIG CLIN: HCPCS | Performed by: INTERNAL MEDICINE

## 2025-05-13 PROCEDURE — 83036 HEMOGLOBIN GLYCOSYLATED A1C: CPT | Performed by: INTERNAL MEDICINE

## 2025-05-13 PROCEDURE — G8419 CALC BMI OUT NRM PARAM NOF/U: HCPCS | Performed by: INTERNAL MEDICINE

## 2025-05-13 PROCEDURE — PBSHW AMB POC HEMOGLOBIN A1C: Performed by: INTERNAL MEDICINE

## 2025-05-13 PROCEDURE — 3078F DIAST BP <80 MM HG: CPT | Performed by: INTERNAL MEDICINE

## 2025-05-13 PROCEDURE — 1123F ACP DISCUSS/DSCN MKR DOCD: CPT | Performed by: INTERNAL MEDICINE

## 2025-05-13 PROCEDURE — 1160F RVW MEDS BY RX/DR IN RCRD: CPT | Performed by: INTERNAL MEDICINE

## 2025-05-13 PROCEDURE — 1036F TOBACCO NON-USER: CPT | Performed by: INTERNAL MEDICINE

## 2025-05-13 PROCEDURE — 3077F SYST BP >= 140 MM HG: CPT | Performed by: INTERNAL MEDICINE

## 2025-05-13 RX ORDER — ATORVASTATIN CALCIUM 10 MG/1
10 TABLET, FILM COATED ORAL NIGHTLY
Qty: 90 TABLET | Refills: 2 | Status: SHIPPED | OUTPATIENT
Start: 2025-05-13

## 2025-05-13 ASSESSMENT — ENCOUNTER SYMPTOMS
SHORTNESS OF BREATH: 0
COUGH: 0
BACK PAIN: 0
EYE ITCHING: 0
SINUS PRESSURE: 0
CHEST TIGHTNESS: 0
WHEEZING: 0

## 2025-05-13 NOTE — PROGRESS NOTES
Duong Vanegas (:  1940) is a 85 y.o. male,Established patient, here for evaluation of the following chief complaint(s):   Chief Complaint   Patient presents with    Follow-up Chronic Condition     Patient needs a refill of lipitor.       HPI   Subjective   SUBJECTIVE/OBJECTIVE  HPI : Patient is here for 4-month follow-up on chronic medical conditions including hypertension, dyslipidemia, coronary artery disease, type 2 diabetes, acid reflux, discussed lab results and medication refills.  Patient states he has overall been doing well with no concerns today.  Has been taking his medications as prescribed.  General review  Patient states his weight has been stable over the last 2 months.  He has been taking his Jardiance and glipizide daily.  He  Acid reflux--patient has been doing well with weight loss and diet changes in the last 6 months.  Does not take any prescription medications.  Does take over-the-counter PPI or Pepcid as needed.   Cardiovascular review  Hypertension--patient states he has been doing well on his current medications.  Has seen his cardiologist recently and medications were refilled.  Denies any chest pains, palpitations, shortness of breath or swelling in the feet.  Dyslipidemia--patient has been doing well on the atorvastatin 10 mg daily, denies any side effects.  Will check a lipid panel today.  Endocrine review  Diabetes mellitus--patient has been doing well on the daily Synjardy and glipizide.  Denies any side effects.  Does not check his blood sugars daily.  Has been eating better and drinking plenty of water.     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

## 2025-05-13 NOTE — PROGRESS NOTES
Chief Complaint   Patient presents with    Follow-up Chronic Condition     Patient needs a refill of lipitor.         1. \"Have you been to the ER or a urgent care clinic since your last visit?  Hospitalized since your last visit?\"    no    2. \"Have you seen or consulted any other health care providers outside of the [facility] since your last visit?\"    no         Click Here for Release of Records Request       Health Maintenance Due   Topic Date Due    DTaP/Tdap/Td vaccine (1 - Tdap) Never done    Shingles vaccine (1 of 2) Never done    Respiratory Syncytial Virus (RSV) Pregnant or age 60 yrs+ (1 - 1-dose 75+ series) Never done    COVID-19 Vaccine (3 - 2024-25 season) 09/01/2024    Diabetic Alb to Cr ratio (uACR) test  03/26/2025 2/26/2025    12:49 PM   PHQ-9    Little interest or pleasure in doing things 0   Feeling down, depressed, or hopeless 0   PHQ-2 Score 0   PHQ-9 Total Score 0           Financial Resource Strain: Low Risk  (11/7/2024)    Overall Financial Resource Strain (CARDIA)     Difficulty of Paying Living Expenses: Not hard at all      Food Insecurity: No Food Insecurity (2/26/2025)    Hunger Vital Sign     Worried About Running Out of Food in the Last Year: Never true     Ran Out of Food in the Last Year: Never true

## 2025-06-12 DIAGNOSIS — E11.9 TYPE 2 DIABETES MELLITUS WITHOUT COMPLICATION, WITHOUT LONG-TERM CURRENT USE OF INSULIN (HCC): ICD-10-CM

## 2025-06-12 RX ORDER — EMPAGLIFLOZIN AND METFORMIN HYDROCHLORIDE 12.5; 1 MG/1; MG/1
1 TABLET ORAL
Qty: 90 TABLET | Refills: 1 | Status: SHIPPED | OUTPATIENT
Start: 2025-06-12

## 2025-06-13 RX ORDER — CARVEDILOL 12.5 MG/1
12.5 TABLET ORAL 2 TIMES DAILY
Qty: 180 TABLET | Refills: 0 | Status: SHIPPED | OUTPATIENT
Start: 2025-06-13

## 2025-06-13 NOTE — TELEPHONE ENCOUNTER
Per verbal order from Dr. Agatha Whitley  Last appt: 7/22/2024     Future Appointments   Date Time Provider Department Center   7/23/2025  2:00 PM Agatha Whitley MD [facility]   9/24/2025  8:30 AM [facility] MRI 1 [facility]   11/6/2025  8:15 AM LAB ONLY [facility] DEP   11/13/2025  1:00 PM Nelia Killian MD [facility]       Requested Prescriptions     Signed Prescriptions Disp Refills    carvedilol (COREG) 12.5 MG tablet 180 tablet 0     Sig: TAKE 1 TABLET BY MOUTH TWICE DAILY     Authorizing Provider: AGATHA WHITLEY     Ordering User: KHOI FRANKEL

## 2025-07-23 ENCOUNTER — OFFICE VISIT (OUTPATIENT)
Age: 85
End: 2025-07-23
Payer: MEDICARE

## 2025-07-23 VITALS
HEART RATE: 73 BPM | DIASTOLIC BLOOD PRESSURE: 62 MMHG | WEIGHT: 156 LBS | BODY MASS INDEX: 25.99 KG/M2 | SYSTOLIC BLOOD PRESSURE: 148 MMHG | HEIGHT: 65 IN | OXYGEN SATURATION: 98 %

## 2025-07-23 DIAGNOSIS — I71.21 ANEURYSM OF ASCENDING AORTA WITHOUT RUPTURE: Primary | ICD-10-CM

## 2025-07-23 DIAGNOSIS — I35.1 NONRHEUMATIC AORTIC VALVE INSUFFICIENCY: ICD-10-CM

## 2025-07-23 DIAGNOSIS — I10 ESSENTIAL (PRIMARY) HYPERTENSION: ICD-10-CM

## 2025-07-23 PROCEDURE — 3077F SYST BP >= 140 MM HG: CPT | Performed by: INTERNAL MEDICINE

## 2025-07-23 PROCEDURE — 1123F ACP DISCUSS/DSCN MKR DOCD: CPT | Performed by: INTERNAL MEDICINE

## 2025-07-23 PROCEDURE — 99214 OFFICE O/P EST MOD 30 MIN: CPT | Performed by: INTERNAL MEDICINE

## 2025-07-23 PROCEDURE — 1036F TOBACCO NON-USER: CPT | Performed by: INTERNAL MEDICINE

## 2025-07-23 PROCEDURE — 1159F MED LIST DOCD IN RCRD: CPT | Performed by: INTERNAL MEDICINE

## 2025-07-23 PROCEDURE — G8419 CALC BMI OUT NRM PARAM NOF/U: HCPCS | Performed by: INTERNAL MEDICINE

## 2025-07-23 PROCEDURE — G8427 DOCREV CUR MEDS BY ELIG CLIN: HCPCS | Performed by: INTERNAL MEDICINE

## 2025-07-23 PROCEDURE — 3078F DIAST BP <80 MM HG: CPT | Performed by: INTERNAL MEDICINE

## 2025-07-23 RX ORDER — LISINOPRIL 40 MG/1
40 TABLET ORAL DAILY
Qty: 90 TABLET | Refills: 3 | Status: SHIPPED | OUTPATIENT
Start: 2025-07-23

## 2025-07-23 NOTE — PROGRESS NOTES
V.O. received from Dr. DEQUAN Whitley MD:    Change Lisinopril to 40mg po daily.      CMP in 2-3 weeks.    Lab orders and lab locations given to the pt    See Dr. Whitley in 1 yr.

## 2025-07-23 NOTE — PROGRESS NOTES
Office Follow-up    NAME: Duong Vanegas   :  1940  MRM:  [id number]    Date: 25             Plan:     CAD/status post LAD MAEGAN stent ×2, POBA Lcx (Dec 2015): Continue ASA and statins.  Moderate aortic regurgitation: The aortic regurgitation is stable. MRI in  show mild AR. Echo 2023 show Mod AR.   Hypertension: BP high in office lately. Increase Lisinopril to 40mg po daily.   Mild aortic aneurysm: CMR May 2022 show stable ascending aorta 41 mm.  Continue to monitor with 3 yearly CMRI.  We will repeat cardiac MRI in . Echo 2023- 4.2cm.   Sleep apnea: Continue CPAP  Hyperlipidemia: Continue statins.   Cholecystectomy 2020.  Diabetes: Has lost wt with Jardiance Metformin combination.   Return to see me again in 1 year.                     Subjective:     No chest pain, SOB, Palpitations.     -------------  Duong Vanegas, a 82 y.o. year-old who presents for followup.  He has known history of CAD status post LAD MAEGAN stent ×2 and POBA of the left circumflex artery.  He underwent cholecystectomy on 2020.   Denies any symptoms of chest pain, shortness of breath, lightheadedness or dizziness.    Exam:     Physical Exam:  Visit Vitals  BP (!) 148/62 (BP Site: Left Upper Arm, Patient Position: Sitting)   Pulse 73   Ht 1.651 m (5' 5\")   Wt 70.8 kg (156 lb)   SpO2 98%   BMI 25.96 kg/m²       General appearance - alert, well appearing, and in no distress  Mental status - affect appropriate to mood  Eyes - sclera anicteric, moist mucous membranes  Neck - supple, no significant adenopathy      Medications:     Current Outpatient Medications   Medication Sig Dispense Refill    carvedilol (COREG) 12.5 MG tablet TAKE 1 TABLET BY MOUTH TWICE DAILY 180 tablet 0    SYNJARDY 12.5-1000 MG TABS TAKE 1 TABLET BY MOUTH EVERY MORNING BEFORE BREAKFAST 90 tablet 1    atorvastatin (LIPITOR) 10 MG tablet Take 1 tablet by mouth nightly 90 tablet 2    Cyanocobalamin (VITAMIN B-12 PO) Take by

## 2025-07-23 NOTE — PROGRESS NOTES
Chief Complaint   Patient presents with    Hypertension    Coronary Artery Disease     AAA  EARL     Vitals:    07/23/25 1356   BP: (!) 148/62   BP Site: Left Upper Arm   Patient Position: Sitting   Pulse: 73   SpO2: 98%   Weight: 70.8 kg (156 lb)   Height: 1.651 m (5' 5\")     Chest pain: DENIED     Recent hospital stays: DENIED     Refills: DENIED

## 2025-08-15 LAB
ALBUMIN SERPL-MCNC: 4.2 G/DL (ref 3.7–4.7)
ALP SERPL-CCNC: 90 IU/L (ref 44–121)
ALT SERPL-CCNC: 15 IU/L (ref 0–44)
AST SERPL-CCNC: 15 IU/L (ref 0–40)
BILIRUB SERPL-MCNC: 0.7 MG/DL (ref 0–1.2)
BUN SERPL-MCNC: 15 MG/DL (ref 8–27)
BUN/CREAT SERPL: 14 (ref 10–24)
CALCIUM SERPL-MCNC: 9.1 MG/DL (ref 8.6–10.2)
CHLORIDE SERPL-SCNC: 107 MMOL/L (ref 96–106)
CO2 SERPL-SCNC: 18 MMOL/L (ref 20–29)
CREAT SERPL-MCNC: 1.07 MG/DL (ref 0.76–1.27)
EGFRCR SERPLBLD CKD-EPI 2021: 68 ML/MIN/1.73
GLOBULIN SER CALC-MCNC: 2.1 G/DL (ref 1.5–4.5)
GLUCOSE SERPL-MCNC: 120 MG/DL (ref 70–99)
POTASSIUM SERPL-SCNC: 4.6 MMOL/L (ref 3.5–5.2)
PROT SERPL-MCNC: 6.3 G/DL (ref 6–8.5)
SODIUM SERPL-SCNC: 141 MMOL/L (ref 134–144)

## 2025-09-03 ENCOUNTER — RESULTS FOLLOW-UP (OUTPATIENT)
Age: 85
End: 2025-09-03

## 2025-09-03 DIAGNOSIS — E11.9 TYPE 2 DIABETES MELLITUS WITHOUT COMPLICATION, WITHOUT LONG-TERM CURRENT USE OF INSULIN (HCC): ICD-10-CM

## 2025-09-03 RX ORDER — GLIPIZIDE 2.5 MG/1
2.5 TABLET, EXTENDED RELEASE ORAL DAILY
Qty: 90 TABLET | Refills: 0 | Status: SHIPPED | OUTPATIENT
Start: 2025-09-03

## (undated) DEVICE — DERMABOND SKIN ADH 0.7ML -- DERMABOND ADVANCED 12/BX

## (undated) DEVICE — ARM DRAPE

## (undated) DEVICE — INSUFFLATION NEEDLE: Brand: SURGINEEDLE

## (undated) DEVICE — CARTRIDGE CLP LIG HEMLOK GRN --

## (undated) DEVICE — Device

## (undated) DEVICE — REM POLYHESIVE ADULT PATIENT RETURN ELECTRODE: Brand: VALLEYLAB

## (undated) DEVICE — COVER LT HNDL PLAS RIG 1 PER PK

## (undated) DEVICE — PREP SKN CHLRAPRP APL 26ML STR --

## (undated) DEVICE — CANISTER, RIGID, 3000CC: Brand: MEDLINE INDUSTRIES, INC.

## (undated) DEVICE — COVER MPLR TIP CRV SCIS ACC DA VINCI

## (undated) DEVICE — INFECTION CONTROL KIT SYS

## (undated) DEVICE — AIRSEAL BIFURCATED SMOKE EVAC FILTERED TUBE SET: Brand: AIRSEAL

## (undated) DEVICE — SURGICAL PROCEDURE KIT GEN LAPAROSCOPY LF

## (undated) DEVICE — ANCHOR TISSUE RETRIEVAL SYSTEM, BAG SIZE 125 ML, PORT SIZE 8 MM: Brand: ANCHOR TISSUE RETRIEVAL SYSTEM

## (undated) DEVICE — TROCAR: Brand: KII FIOS FIRST ENTRY

## (undated) DEVICE — STRAP,POSITIONING,KNEE/BODY,FOAM,4X60": Brand: MEDLINE

## (undated) DEVICE — SUTURE SZ 0 27IN 5/8 CIR UR-6  TAPER PT VIOLET ABSRB VICRYL J603H

## (undated) DEVICE — SOL IRRIGATION INJ NACL 0.9% 500ML BTL

## (undated) DEVICE — TOWEL SURG W17XL27IN STD BLU COT NONFENESTRATED PREWASHED

## (undated) DEVICE — NEEDLE HYPO 22GA L1.5IN BLK S STL HUB POLYPR SHLD REG BVL

## (undated) DEVICE — LAPAROSCOPIC TROCAR SLEEVE/SINGLE USE: Brand: KII® OPTICAL ACCESS SYSTEM

## (undated) DEVICE — BLADELESS OBTURATOR: Brand: WECK VISTA

## (undated) DEVICE — SUTURE COAT VCRL SZ 4-0 L18IN ABSRB UD L19MM PS-2 1/2 CIR J496G

## (undated) DEVICE — DEVICE TRNSF SPIK STL 2008S] MICROTEK MEDICAL INC]

## (undated) DEVICE — STERILE POLYISOPRENE POWDER-FREE SURGICAL GLOVES: Brand: PROTEXIS

## (undated) DEVICE — VISUALIZATION SYSTEM: Brand: CLEARIFY

## (undated) DEVICE — SEAL UNIV 5-8MM DISP BX/10 -- DA VINCI XI - SNGL USE

## (undated) DEVICE — COVER,MAYO STAND,STERILE: Brand: MEDLINE

## (undated) DEVICE — DRAPE,REIN 53X77,STERILE: Brand: MEDLINE

## (undated) DEVICE — ELECTRO LUBE IS A SINGLE PATIENT USE DEVICE THAT IS INTENDED TO BE USED ON ELECTROSURGICAL ELECTRODES TO REDUCE STICKING.: Brand: KEY SURGICAL ELECTRO LUBE